# Patient Record
Sex: FEMALE | Race: WHITE | Employment: OTHER | URBAN - NONMETROPOLITAN AREA
[De-identification: names, ages, dates, MRNs, and addresses within clinical notes are randomized per-mention and may not be internally consistent; named-entity substitution may affect disease eponyms.]

---

## 2017-01-30 ENCOUNTER — NURSE TRIAGE (OUTPATIENT)
Dept: ADMINISTRATIVE | Age: 82
End: 2017-01-30

## 2017-03-03 ENCOUNTER — NURSE TRIAGE (OUTPATIENT)
Dept: ADMINISTRATIVE | Age: 82
End: 2017-03-03

## 2017-03-15 ENCOUNTER — OFFICE VISIT (OUTPATIENT)
Dept: CARDIOLOGY | Age: 82
End: 2017-03-15

## 2017-03-15 VITALS
HEIGHT: 64 IN | BODY MASS INDEX: 20.32 KG/M2 | HEART RATE: 64 BPM | SYSTOLIC BLOOD PRESSURE: 168 MMHG | DIASTOLIC BLOOD PRESSURE: 72 MMHG | WEIGHT: 119 LBS

## 2017-03-15 DIAGNOSIS — I44.7 LBBB (LEFT BUNDLE BRANCH BLOCK): Primary | ICD-10-CM

## 2017-03-15 DIAGNOSIS — I10 ESSENTIAL HYPERTENSION: ICD-10-CM

## 2017-03-15 PROCEDURE — 99214 OFFICE O/P EST MOD 30 MIN: CPT | Performed by: NUCLEAR MEDICINE

## 2017-03-15 PROCEDURE — G8484 FLU IMMUNIZE NO ADMIN: HCPCS | Performed by: NUCLEAR MEDICINE

## 2017-03-15 PROCEDURE — G8419 CALC BMI OUT NRM PARAM NOF/U: HCPCS | Performed by: NUCLEAR MEDICINE

## 2017-03-15 PROCEDURE — G8428 CUR MEDS NOT DOCUMENT: HCPCS | Performed by: NUCLEAR MEDICINE

## 2017-03-15 PROCEDURE — 1036F TOBACCO NON-USER: CPT | Performed by: NUCLEAR MEDICINE

## 2017-03-15 PROCEDURE — 1090F PRES/ABSN URINE INCON ASSESS: CPT | Performed by: NUCLEAR MEDICINE

## 2017-03-15 PROCEDURE — 4040F PNEUMOC VAC/ADMIN/RCVD: CPT | Performed by: NUCLEAR MEDICINE

## 2017-03-15 PROCEDURE — 1123F ACP DISCUSS/DSCN MKR DOCD: CPT | Performed by: NUCLEAR MEDICINE

## 2017-03-15 RX ORDER — LISINOPRIL 5 MG/1
5 TABLET ORAL DAILY
Qty: 90 TABLET | Refills: 3 | Status: SHIPPED | OUTPATIENT
Start: 2017-03-15 | End: 2017-06-20 | Stop reason: SDUPTHER

## 2017-03-19 ENCOUNTER — NURSE TRIAGE (OUTPATIENT)
Dept: ADMINISTRATIVE | Age: 82
End: 2017-03-19

## 2017-03-20 ENCOUNTER — NURSE TRIAGE (OUTPATIENT)
Dept: ADMINISTRATIVE | Age: 82
End: 2017-03-20

## 2017-04-05 ENCOUNTER — TELEPHONE (OUTPATIENT)
Dept: CARDIOLOGY | Age: 82
End: 2017-04-05

## 2017-04-05 DIAGNOSIS — R94.39 ABNORMAL STRESS TEST: Primary | ICD-10-CM

## 2017-04-05 DIAGNOSIS — R06.02 SHORTNESS OF BREATH: ICD-10-CM

## 2017-04-28 PROBLEM — Z98.61 S/P PTCA (PERCUTANEOUS TRANSLUMINAL CORONARY ANGIOPLASTY): Status: ACTIVE | Noted: 2017-04-28

## 2017-04-28 PROBLEM — Z98.890 S/P CARDIAC CATHETERIZATION: Status: ACTIVE | Noted: 2017-04-28

## 2017-05-01 ENCOUNTER — TELEPHONE (OUTPATIENT)
Dept: CARDIOLOGY | Age: 82
End: 2017-05-01

## 2017-05-10 RX ORDER — CLOPIDOGREL BISULFATE 75 MG/1
75 TABLET ORAL DAILY
COMMUNITY
End: 2017-05-10 | Stop reason: SDUPTHER

## 2017-05-10 RX ORDER — CLOPIDOGREL BISULFATE 75 MG/1
75 TABLET ORAL DAILY
Qty: 30 TABLET | Refills: 0 | Status: SHIPPED | OUTPATIENT
Start: 2017-05-10 | End: 2017-05-17 | Stop reason: SDUPTHER

## 2017-05-17 RX ORDER — CLOPIDOGREL BISULFATE 75 MG/1
75 TABLET ORAL DAILY
Qty: 90 TABLET | Refills: 3 | Status: SHIPPED | OUTPATIENT
Start: 2017-05-17 | End: 2017-08-14 | Stop reason: SDUPTHER

## 2017-05-22 ENCOUNTER — OFFICE VISIT (OUTPATIENT)
Dept: CARDIOLOGY | Age: 82
End: 2017-05-22

## 2017-05-22 VITALS
SYSTOLIC BLOOD PRESSURE: 116 MMHG | HEIGHT: 64 IN | HEART RATE: 48 BPM | DIASTOLIC BLOOD PRESSURE: 72 MMHG | BODY MASS INDEX: 19.97 KG/M2 | WEIGHT: 117 LBS

## 2017-05-22 DIAGNOSIS — E78.00 PURE HYPERCHOLESTEROLEMIA: ICD-10-CM

## 2017-05-22 DIAGNOSIS — I25.10 CORONARY ARTERY DISEASE INVOLVING NATIVE CORONARY ARTERY OF NATIVE HEART WITHOUT ANGINA PECTORIS: Primary | ICD-10-CM

## 2017-05-22 DIAGNOSIS — Z98.890 S/P CARDIAC CATHETERIZATION: ICD-10-CM

## 2017-05-22 DIAGNOSIS — I10 ESSENTIAL HYPERTENSION: ICD-10-CM

## 2017-05-22 DIAGNOSIS — Z98.61 S/P PTCA (PERCUTANEOUS TRANSLUMINAL CORONARY ANGIOPLASTY): ICD-10-CM

## 2017-05-22 PROCEDURE — 1036F TOBACCO NON-USER: CPT | Performed by: PHYSICIAN ASSISTANT

## 2017-05-22 PROCEDURE — G8419 CALC BMI OUT NRM PARAM NOF/U: HCPCS | Performed by: PHYSICIAN ASSISTANT

## 2017-05-22 PROCEDURE — 4040F PNEUMOC VAC/ADMIN/RCVD: CPT | Performed by: PHYSICIAN ASSISTANT

## 2017-05-22 PROCEDURE — 99213 OFFICE O/P EST LOW 20 MIN: CPT | Performed by: PHYSICIAN ASSISTANT

## 2017-05-22 PROCEDURE — 1090F PRES/ABSN URINE INCON ASSESS: CPT | Performed by: PHYSICIAN ASSISTANT

## 2017-05-22 PROCEDURE — G8427 DOCREV CUR MEDS BY ELIG CLIN: HCPCS | Performed by: PHYSICIAN ASSISTANT

## 2017-05-22 PROCEDURE — 93000 ELECTROCARDIOGRAM COMPLETE: CPT | Performed by: PHYSICIAN ASSISTANT

## 2017-05-22 PROCEDURE — 1123F ACP DISCUSS/DSCN MKR DOCD: CPT | Performed by: PHYSICIAN ASSISTANT

## 2017-05-22 PROCEDURE — G8598 ASA/ANTIPLAT THER USED: HCPCS | Performed by: PHYSICIAN ASSISTANT

## 2017-05-22 RX ORDER — ASPIRIN 81 MG/1
81 TABLET, CHEWABLE ORAL DAILY
COMMUNITY

## 2017-06-20 RX ORDER — LISINOPRIL 5 MG/1
5 TABLET ORAL DAILY
Qty: 90 TABLET | Refills: 3 | Status: ON HOLD | OUTPATIENT
Start: 2017-06-20 | End: 2019-06-18 | Stop reason: SDUPTHER

## 2017-07-30 ENCOUNTER — NURSE TRIAGE (OUTPATIENT)
Dept: ADMINISTRATIVE | Age: 82
End: 2017-07-30

## 2017-08-14 RX ORDER — CLOPIDOGREL BISULFATE 75 MG/1
75 TABLET ORAL DAILY
Qty: 90 TABLET | Refills: 1 | Status: SHIPPED | OUTPATIENT
Start: 2017-08-14 | End: 2018-09-06 | Stop reason: ALTCHOICE

## 2017-11-20 ENCOUNTER — OFFICE VISIT (OUTPATIENT)
Dept: CARDIOLOGY CLINIC | Age: 82
End: 2017-11-20
Payer: MEDICARE

## 2017-11-20 VITALS
WEIGHT: 112 LBS | DIASTOLIC BLOOD PRESSURE: 72 MMHG | BODY MASS INDEX: 19.12 KG/M2 | HEIGHT: 64 IN | HEART RATE: 72 BPM | SYSTOLIC BLOOD PRESSURE: 144 MMHG

## 2017-11-20 DIAGNOSIS — R06.09 DYSPNEA ON EXERTION: ICD-10-CM

## 2017-11-20 DIAGNOSIS — I10 ESSENTIAL HYPERTENSION: Primary | ICD-10-CM

## 2017-11-20 DIAGNOSIS — I25.10 CORONARY ARTERY DISEASE INVOLVING NATIVE CORONARY ARTERY OF NATIVE HEART WITHOUT ANGINA PECTORIS: ICD-10-CM

## 2017-11-20 PROCEDURE — 1123F ACP DISCUSS/DSCN MKR DOCD: CPT | Performed by: NUCLEAR MEDICINE

## 2017-11-20 PROCEDURE — G8598 ASA/ANTIPLAT THER USED: HCPCS | Performed by: NUCLEAR MEDICINE

## 2017-11-20 PROCEDURE — 4040F PNEUMOC VAC/ADMIN/RCVD: CPT | Performed by: NUCLEAR MEDICINE

## 2017-11-20 PROCEDURE — 99213 OFFICE O/P EST LOW 20 MIN: CPT | Performed by: NUCLEAR MEDICINE

## 2017-11-20 PROCEDURE — 1090F PRES/ABSN URINE INCON ASSESS: CPT | Performed by: NUCLEAR MEDICINE

## 2017-11-20 PROCEDURE — 1036F TOBACCO NON-USER: CPT | Performed by: NUCLEAR MEDICINE

## 2017-11-20 PROCEDURE — G8484 FLU IMMUNIZE NO ADMIN: HCPCS | Performed by: NUCLEAR MEDICINE

## 2017-11-20 PROCEDURE — G8427 DOCREV CUR MEDS BY ELIG CLIN: HCPCS | Performed by: NUCLEAR MEDICINE

## 2017-11-20 PROCEDURE — G8420 CALC BMI NORM PARAMETERS: HCPCS | Performed by: NUCLEAR MEDICINE

## 2017-11-20 NOTE — PROGRESS NOTES
[facility] PROFESSIONAL SERVS  [facility] [place] [address]  Dept: [phone]  Dept Fax: [phone]  Loc: [phone]    Visit Date: 11/20/2017    Shahzad Yuen is a 80 y.o. female who presents today for:  Chief Complaint   Patient presents with    Check-Up    Coronary Artery Disease    Hypertension    Hyperlipidemia     Had cath and stent in April   Chronic dyspnea  No chest pain  No changes in breathing  Limited patient  Some denial   BP is stable     HPI:  HPI  Past Medical History:   Diagnosis Date    Asthma     Cancer ([facility] [address]     colon    Hypertension     S/P cardiac catheterization: 4/28/2017: diffuse disease in mid-LAD with multiple lesions in the 80% range. LCX non-dominant with moderate disease. RCA dominant with moderate diffuse disease. 4/28/2017 4/28/2017: diffuse disease in mid-LAD with multiple lesions in the 80% range. LCX non-dominant with moderate disease. RCA dominant with moderate diffuse disease. Radial. 70 cc Dr. Evans García S/P PTCA: 4/28/2017: Stent mid-LAD Xience 3.0 x 33 mm, post-dilated 3.32 mm. Radial 70 cc. 4/28/2017 4/28/2017: Stent mid-LAD Xience 3.0 x 33 mm, post-dilated 3.32 mm. Radial 70 cc.  Dr. Bonne Holter      Past Surgical History:   Procedure Laterality Date   [facility] COLON SURGERY  05/2016    for cancer   [address]    JOINT REPLACEMENT  2007    left knee    SKIN BIOPSY       Family History   Problem Relation Age of Onset    Stroke Mother     High Blood Pressure Mother     Cancer Sister     Asthma Neg Hx     Diabetes Neg Hx     Heart Disease Neg Hx      Social History   Substance Use Topics    Smoking status: Never Smoker    Smokeless tobacco: Never Used    Alcohol use 0.6 oz/week     1 Cans of beer per week      Comment: occ      Current Outpatient Prescriptions   Medication Sig Dispense Refill    clopidogrel (PLAVIX) 75 MG tablet Take 1 tablet by mouth daily 90 gallops  Abdomen:   Soft, non tender, no organomegalies, positive bowel sounds  Extremities:   No edema, no cyanosis, good peripheral pulses  Neurological:   Awake, alert, oriented. No obvious focal deficits  Musculoskelatal:  No obvious deformities    Assessment:     1. Essential hypertension     2. Dyspnea on exertion     3. Coronary artery disease involving native coronary artery of native heart without angina pectoris     cardiac stable for now     Plan:  No Follow-up on file. As above  Continue risk factor modification and medical management  Thank you for allowing me to participate in the care of your patient. Please don't hesitate to contact me regarding any further issues related to the patient care    Orders Placed:  No orders of the defined types were placed in this encounter. Medications Prescribed:  No orders of the defined types were placed in this encounter. Discussed use, benefit, and side effects of prescribed medications. All patient questions answered. Pt voiced understanding. Instructed to continue current medications, diet and exercise. Continue risk factor modification and medical management. Patient agreed with treatment plan. Follow up as directed.     Electronically signed by Alfrieda Hammans, MD on 11/20/2017 at 1:38 PM

## 2017-12-10 ENCOUNTER — NURSE TRIAGE (OUTPATIENT)
Dept: ADMINISTRATIVE | Age: 82
End: 2017-12-10

## 2017-12-10 NOTE — TELEPHONE ENCOUNTER
Reason for Disposition   Minor cut or scratch (all triage questions negative)    Answer Assessment - Initial Assessment Questions  1. APPEARANCE of INJURY: \"What does the injury look like? \"       Small laceration    2. SIZE: \"How large is the cut?\"       An inch   3. BLEEDING: \"Is it bleeding now? \" If so, ask: \"Is it difficult to stop? \"       Scab off on the corner  And started to ooze   4. LOCATION: \"Where is the injury located? \"       Rt keg   5. ONSET: \"How long ago did the injury occur? \"       About 5 days ago   6. MECHANISM: \"Tell me how it happened. \"       Brushed along the doorway   7. TETANUS: \"When was the last tetanus booster? \"      Not known   8. PREGNANCY: \"Is there any chance you are pregnant? \" \"When was your last menstrual period? \"      n/a    Protocols used: CUTS AND LACERATIONS-ADULT-

## 2018-03-10 ENCOUNTER — HOSPITAL ENCOUNTER (EMERGENCY)
Age: 83
Discharge: HOME | End: 2018-03-10
Attending: NURSE PRACTITIONER
Payer: MEDICARE

## 2018-03-10 ENCOUNTER — HOSPITAL ENCOUNTER (EMERGENCY)
Dept: GENERAL RADIOLOGY | Age: 83
Discharge: HOME | End: 2018-03-10
Payer: MEDICARE

## 2018-03-10 VITALS
WEIGHT: 111 LBS | RESPIRATION RATE: 18 BRPM | SYSTOLIC BLOOD PRESSURE: 180 MMHG | HEART RATE: 81 BPM | DIASTOLIC BLOOD PRESSURE: 80 MMHG | BODY MASS INDEX: 19.05 KG/M2 | OXYGEN SATURATION: 96 % | TEMPERATURE: 98.1 F

## 2018-03-10 DIAGNOSIS — S32.591A CLOSED FRACTURE OF MULTIPLE RAMI OF RIGHT PUBIS, INITIAL ENCOUNTER (HCC): Primary | ICD-10-CM

## 2018-03-10 PROCEDURE — 73502 X-RAY EXAM HIP UNI 2-3 VIEWS: CPT

## 2018-03-10 PROCEDURE — 99213 OFFICE O/P EST LOW 20 MIN: CPT

## 2018-03-10 PROCEDURE — 99213 OFFICE O/P EST LOW 20 MIN: CPT | Performed by: NURSE PRACTITIONER

## 2018-03-10 ASSESSMENT — PAIN DESCRIPTION - DESCRIPTORS: DESCRIPTORS: ACHING

## 2018-03-10 ASSESSMENT — PAIN DESCRIPTION - PAIN TYPE: TYPE: ACUTE PAIN

## 2018-03-10 ASSESSMENT — PAIN SCALES - GENERAL: PAINLEVEL_OUTOF10: 8

## 2018-03-10 ASSESSMENT — PAIN DESCRIPTION - ORIENTATION: ORIENTATION: RIGHT

## 2018-03-10 ASSESSMENT — PAIN DESCRIPTION - FREQUENCY: FREQUENCY: INTERMITTENT

## 2018-03-10 ASSESSMENT — PAIN DESCRIPTION - LOCATION: LOCATION: HIP

## 2018-03-10 NOTE — ED PROVIDER NOTES
Colon surgery (05/2016); and Appendectomy (9693). CURRENT MEDICATIONS       Current Discharge Medication List      CONTINUE these medications which have NOT CHANGED    Details   clopidogrel (PLAVIX) 75 MG tablet Take 1 tablet by mouth daily  Qty: 90 tablet, Refills: 1      lisinopril (PRINIVIL;ZESTRIL) 5 MG tablet Take 1 tablet by mouth daily  Qty: 90 tablet, Refills: 3      aspirin 81 MG chewable tablet Take 81 mg by mouth daily      atorvastatin (LIPITOR) 40 MG tablet Take 1 tablet by mouth nightly  Qty: 30 tablet, Refills: 3      Budesonide-Formoterol Fumarate (SYMBICORT IN) Inhale into the lungs as needed      metoprolol tartrate (LOPRESSOR) 25 MG tablet Take 1 tablet by mouth 2 times daily  Qty: 180 tablet, Refills: 3      Omega-3 Fatty Acids (FISH OIL PO) Take by mouth daily       vitamin D3 (CHOLECALCIFEROL) 400 UNITS TABS tablet Take by mouth daily       Calcium Carbonate-Vitamin D (CALCIUM-VITAMIN D3 PO) Take 1,200 mg by mouth daily       Coenzyme Q10 (CO Q 10) 100 MG CAPS Take by mouth daily       nitroGLYCERIN (NITROSTAT) 0.4 MG SL tablet Place 1 tablet under the tongue every 5 minutes as needed for Chest pain up to max of 3 total doses. If no relief after 1 dose, call 911. Qty: 25 tablet, Refills: 1             ALLERGIES     Patient is is allergic to brilinta [ticagrelor] and demerol hcl [meperidine]. FAMILY HISTORY     Patient's family history includes Cancer in her sister; High Blood Pressure in her mother; Stroke in her mother. SOCIAL HISTORY     Patient  reports that she has never smoked. She has never used smokeless tobacco. She reports that she drinks about 0.6 oz of alcohol per week . She reports that she does not use drugs. PHYSICAL EXAM     ED TRIAGE VITALS  BP: (!) 180/80, Temp: 98.1 °F (36.7 °C), Pulse: 81, Resp: 18, SpO2: 96 %  Physical Exam   Constitutional: She is oriented to person, place, and time. She appears well-developed and well-nourished. No distress.    Musculoskeletal: Medication List          Elba Flavors, CNP           Elba Flavors, [address] [facility]  03/10/18 0031

## 2018-03-10 NOTE — ED NOTES
Pt taken to side exit per wheelchair and assisted into her son's car.       Aida Cantor RN  03/10/18 7092

## 2018-03-10 NOTE — ED NOTES
Pt ambulated to room 7 with a slight limp to the right leg. Pt here with right hip pain from a fall that occurred 1 week ago yesterday as stated by the pt. Pt's son claims that the she told him it happened yesterday but the pt insists that fall was a week ago. Pt states that she was cleaning her oven and had a kitchen chair in front of the oven door and was bending over cleaning and twisted falling off the chair. Pt states that she fell onto her right hip on the kitchen floor.  No redness, swelling or bruisng noted to the right hip or thigh where pt is locating the pain at.       Cecy Castillo RN  03/10/18 5161

## 2018-08-28 ENCOUNTER — HOSPITAL ENCOUNTER (OUTPATIENT)
Age: 83
Discharge: HOME | End: 2018-08-28
Payer: MEDICARE

## 2018-08-28 LAB
ANION GAP SERPL CALCULATED.3IONS-SCNC: 11 MEQ/L (ref 8–16)
BUN BLDV-MCNC: 17 MG/DL (ref 7–22)
CALCIUM SERPL-MCNC: 9.1 MG/DL (ref 8.5–10.5)
CHLORIDE BLD-SCNC: 105 MEQ/L (ref 98–111)
CO2: 25 MEQ/L (ref 23–33)
CREAT SERPL-MCNC: 1 MG/DL (ref 0.4–1.2)
EKG ATRIAL RATE: 53 BPM
EKG P AXIS: 81 DEGREES
EKG P-R INTERVAL: 270 MS
EKG Q-T INTERVAL: 528 MS
EKG QRS DURATION: 162 MS
EKG QTC CALCULATION (BAZETT): 495 MS
EKG R AXIS: 82 DEGREES
EKG T AXIS: 103 DEGREES
EKG VENTRICULAR RATE: 53 BPM
ERYTHROCYTE [DISTWIDTH] IN BLOOD BY AUTOMATED COUNT: 13.1 % (ref 11.5–14.5)
ERYTHROCYTE [DISTWIDTH] IN BLOOD BY AUTOMATED COUNT: 46.1 FL (ref 35–45)
GFR SERPL CREATININE-BSD FRML MDRD: 52 ML/MIN/1.73M2
GLUCOSE BLD-MCNC: 83 MG/DL (ref 70–108)
HCT VFR BLD CALC: 34.2 % (ref 37–47)
HEMOGLOBIN: 11.4 GM/DL (ref 12–16)
MCH RBC QN AUTO: 32.3 PG (ref 26–33)
MCHC RBC AUTO-ENTMCNC: 33.3 GM/DL (ref 32.2–35.5)
MCV RBC AUTO: 96.9 FL (ref 81–99)
PLATELET # BLD: 229 THOU/MM3 (ref 130–400)
PMV BLD AUTO: 9.6 FL (ref 9.4–12.4)
POTASSIUM SERPL-SCNC: 4.5 MEQ/L (ref 3.5–5.2)
RBC # BLD: 3.53 MILL/MM3 (ref 4.2–5.4)
SODIUM BLD-SCNC: 141 MEQ/L (ref 135–145)
WBC # BLD: 4.6 THOU/MM3 (ref 4.8–10.8)

## 2018-08-28 PROCEDURE — 93010 ELECTROCARDIOGRAM REPORT: CPT | Performed by: NUCLEAR MEDICINE

## 2018-08-28 PROCEDURE — 85027 COMPLETE CBC AUTOMATED: CPT

## 2018-08-28 PROCEDURE — 93005 ELECTROCARDIOGRAM TRACING: CPT | Performed by: SPECIALIST

## 2018-08-28 PROCEDURE — 80048 BASIC METABOLIC PNL TOTAL CA: CPT

## 2018-08-28 PROCEDURE — 36415 COLL VENOUS BLD VENIPUNCTURE: CPT

## 2018-09-04 NOTE — PROGRESS NOTES
EKG & pt. History reviewed per Dr. Ynes Leonard. OK to proceed with surgery @ [facility]-.

## 2018-09-06 NOTE — PROGRESS NOTES
In preparation for their surgical procedure above patient was screened for Obstructive Sleep Apnea (RANDA) using the STOP-Bang Questionnaire by the Pre-Admission Testing department. This is a pre-surgical screening tool for patient safety and serves as a recommendation, this WILL NOT cause cancellation of surgery. STOP-Bang Questionnaire  * Do you currently see a pulmonologist?  No     If yes STOP, do not complete. P    1. Do you snore loudly (able to be heard in the next room)? No    2. Do you often feel tired or sleepy during the daytime? No       3. Has anyone ever told you that you stop breathing during your sleep? No    4. Do you have or are you being treated for high blood pressure? No      5. BMI more than 35? BMI (Calculated): 18.6        No    6. Age over 48 years? 80 y.o. Yes    7. Neck Circumference greater than 17 inches for male or 16 inches for female? Measured           (visits only)            Not Applicable    8. Gender Male? No      TOTAL SCORE: 1    RANDA - Low Risk : Yes to 0 - 2 questions  RANDA - Intermediate Risk : Yes to 3 - 4 questions  RANDA - High Risk : Yes to 5 - 8 questions    Adapted from:   STOP Questionnaire: A Tool to Screen Patients for Obstructive Sleep Apnea   MAYANK StephensC.P.C., Tamra Klinefelter, M.B.B.S., Lo Pérez M.D., Jassi Daley. Blaze Sen, Ph.D., NEIL Olsen.B.B.S., Mindy Monae, M.Sc., Rosmery Mcgowan M.D., [facility]. ALFRED Chatman.P.C.    Anesthesiology 2008; 913:137-84 Copyright 2008, the [address] of [employer]est[employer], [place].   ----------------------------------------------------------------------------------------------------------------

## 2018-09-06 NOTE — PROGRESS NOTES
NPO after midnight  [employer] and drivers license  Wear comfortable clean clothing  Do not bring jewelry   Shower night before and morning of surgery with a liquid antibacterial soap  Bring medications in original bottles  Follow all instructions given by your physician   needed at discharge  Call [phone] for any questions

## 2018-09-12 ENCOUNTER — HOSPITAL ENCOUNTER (OUTPATIENT)
Age: 83
Setting detail: OUTPATIENT SURGERY
Discharge: HOME | End: 2018-09-12
Attending: SPECIALIST | Admitting: SPECIALIST
Payer: MEDICARE

## 2018-09-12 ENCOUNTER — ANESTHESIA (OUTPATIENT)
Dept: OPERATING ROOM | Age: 83
End: 2018-09-12
Payer: MEDICARE

## 2018-09-12 ENCOUNTER — ANESTHESIA EVENT (OUTPATIENT)
Dept: OPERATING ROOM | Age: 83
End: 2018-09-12
Payer: MEDICARE

## 2018-09-12 VITALS
BODY MASS INDEX: 16.81 KG/M2 | SYSTOLIC BLOOD PRESSURE: 113 MMHG | TEMPERATURE: 97.3 F | WEIGHT: 104.6 LBS | RESPIRATION RATE: 16 BRPM | OXYGEN SATURATION: 98 % | DIASTOLIC BLOOD PRESSURE: 56 MMHG | HEART RATE: 68 BPM | HEIGHT: 66 IN

## 2018-09-12 VITALS
DIASTOLIC BLOOD PRESSURE: 67 MMHG | SYSTOLIC BLOOD PRESSURE: 157 MMHG | RESPIRATION RATE: 24 BRPM | OXYGEN SATURATION: 93 %

## 2018-09-12 PROCEDURE — 3600000002 HC SURGERY LEVEL 2 BASE: Performed by: SPECIALIST

## 2018-09-12 PROCEDURE — 3700000001 HC ADD 15 MINUTES (ANESTHESIA): Performed by: SPECIALIST

## 2018-09-12 PROCEDURE — 6360000002 HC RX W HCPCS: Performed by: SPECIALIST

## 2018-09-12 PROCEDURE — 7100000011 HC PHASE II RECOVERY - ADDTL 15 MIN: Performed by: SPECIALIST

## 2018-09-12 PROCEDURE — 2709999900 HC NON-CHARGEABLE SUPPLY: Performed by: SPECIALIST

## 2018-09-12 PROCEDURE — 2580000003 HC RX 258: Performed by: SPECIALIST

## 2018-09-12 PROCEDURE — 6360000002 HC RX W HCPCS: Performed by: NURSE ANESTHETIST, CERTIFIED REGISTERED

## 2018-09-12 PROCEDURE — 2500000003 HC RX 250 WO HCPCS: Performed by: SPECIALIST

## 2018-09-12 PROCEDURE — 3700000000 HC ANESTHESIA ATTENDED CARE: Performed by: SPECIALIST

## 2018-09-12 PROCEDURE — 3600000012 HC SURGERY LEVEL 2 ADDTL 15MIN: Performed by: SPECIALIST

## 2018-09-12 PROCEDURE — 7100000010 HC PHASE II RECOVERY - FIRST 15 MIN: Performed by: SPECIALIST

## 2018-09-12 RX ORDER — PROPOFOL 10 MG/ML
INJECTION, EMULSION INTRAVENOUS CONTINUOUS PRN
Status: DISCONTINUED | OUTPATIENT
Start: 2018-09-12 | End: 2018-09-12 | Stop reason: SDUPTHER

## 2018-09-12 RX ORDER — MIDAZOLAM HYDROCHLORIDE 1 MG/ML
INJECTION INTRAMUSCULAR; INTRAVENOUS PRN
Status: DISCONTINUED | OUTPATIENT
Start: 2018-09-12 | End: 2018-09-12 | Stop reason: SDUPTHER

## 2018-09-12 RX ORDER — LIDOCAINE HYDROCHLORIDE AND EPINEPHRINE 10; 10 MG/ML; UG/ML
INJECTION, SOLUTION INFILTRATION; PERINEURAL PRN
Status: DISCONTINUED | OUTPATIENT
Start: 2018-09-12 | End: 2018-09-12 | Stop reason: HOSPADM

## 2018-09-12 RX ORDER — SODIUM CHLORIDE 9 MG/ML
INJECTION, SOLUTION INTRAVENOUS CONTINUOUS
Status: DISCONTINUED | OUTPATIENT
Start: 2018-09-12 | End: 2018-09-12 | Stop reason: HOSPADM

## 2018-09-12 RX ORDER — SODIUM CHLORIDE 9 MG/ML
INJECTION, SOLUTION INTRAVENOUS CONTINUOUS
Status: DISCONTINUED | OUTPATIENT
Start: 2018-09-12 | End: 2018-09-12 | Stop reason: DRUGHIGH

## 2018-09-12 RX ORDER — CEFADROXIL 500 MG/1
500 CAPSULE ORAL 2 TIMES DAILY
Status: ON HOLD | COMMUNITY
End: 2019-06-17 | Stop reason: HOSPADM

## 2018-09-12 RX ADMIN — PROPOFOL 25 MCG/KG/MIN: 10 INJECTION, EMULSION INTRAVENOUS at 13:13

## 2018-09-12 RX ADMIN — MIDAZOLAM HYDROCHLORIDE 2 MG: 1 INJECTION, SOLUTION INTRAMUSCULAR; INTRAVENOUS at 13:12

## 2018-09-12 RX ADMIN — SODIUM CHLORIDE: 9 INJECTION, SOLUTION INTRAVENOUS at 13:10

## 2018-09-12 RX ADMIN — CEFAZOLIN 1 G: 1 INJECTION, POWDER, FOR SOLUTION INTRAMUSCULAR; INTRAVENOUS; PARENTERAL at 13:23

## 2018-09-12 ASSESSMENT — PULMONARY FUNCTION TESTS
PIF_VALUE: 0
PIF_VALUE: 0
PIF_VALUE: 1
PIF_VALUE: 0
PIF_VALUE: 1
PIF_VALUE: 0
PIF_VALUE: 1
PIF_VALUE: 0
PIF_VALUE: 1
PIF_VALUE: 0
PIF_VALUE: 1
PIF_VALUE: 0
PIF_VALUE: 1
PIF_VALUE: 0

## 2018-09-12 ASSESSMENT — PAIN SCALES - GENERAL: PAINLEVEL_OUTOF10: 0

## 2018-09-12 ASSESSMENT — PAIN - FUNCTIONAL ASSESSMENT: PAIN_FUNCTIONAL_ASSESSMENT: 0-10

## 2018-09-12 NOTE — PROGRESS NOTES
Dr. Franklyn Cuadra updated that patient's daughter would like to speak to him post op about wound care.

## 2018-09-12 NOTE — ANESTHESIA PRE PROCEDURE
Department of Anesthesiology  Preprocedure Note       Name:  Matilda Rosado   Age:  80 y.o.  :  8/3/1930                                          MRN:  [id number]         Date:  2018      Surgeon: Vincent Faye):  Jordon Cespedes MD    Procedure: Procedure(s):  MOHS REPAIR BCC RIGHT NASAL TIP    Medications prior to admission:   Prior to Admission medications    Medication Sig Start Date End Date Taking? Authorizing Provider   cefadroxil (DURICEF) 500 MG capsule Take 500 mg by mouth 2 times daily   Yes Historical Provider, MD   lisinopril (PRINIVIL;ZESTRIL) 5 MG tablet Take 1 tablet by mouth daily 17  Yes Cody Bañuelos MD   aspirin 81 MG chewable tablet Take 81 mg by mouth daily   Yes Historical Provider, MD   atorvastatin (LIPITOR) 40 MG tablet Take 1 tablet by mouth nightly 17  Yes JASE Flaherty CNP   metoprolol tartrate (LOPRESSOR) 25 MG tablet Take 1 tablet by mouth 2 times daily 3/15/17  Yes Cody Bañuelos MD   Omega-3 Fatty Acids (FISH OIL PO) Take by mouth daily    Yes Historical Provider, MD   vitamin D3 (CHOLECALCIFEROL) 400 UNITS TABS tablet Take by mouth daily    Yes Historical Provider, MD   Calcium Carbonate-Vitamin D (CALCIUM-VITAMIN D3 PO) Take 1,200 mg by mouth daily    Yes Historical Provider, MD   Coenzyme Q10 (CO Q 10) 100 MG CAPS Take by mouth daily    Yes Historical Provider, MD   nitroGLYCERIN (NITROSTAT) 0.4 MG SL tablet Place 1 tablet under the tongue every 5 minutes as needed for Chest pain up to max of 3 total doses.  If no relief after 1 dose, call 911. 17   JASE Flaherty CNP   Budesonide-Formoterol Fumarate (SYMBICORT IN) Inhale into the lungs as needed    Historical Provider, MD       Current medications:    Current Facility-Administered Medications   Medication Dose Route Frequency Provider Last Rate Last Dose    0.9 % sodium chloride infusion   Intravenous Continuous Jordon Cespedes MD        ceFAZolin (ANCEF) 1 g in Counseling given: Not Answered      Vital Signs (Current):   Vitals:    09/06/18 1332   Weight: 115 lb (52.2 kg)   Height: 5' 6\" (1.676 m)                                              BP Readings from Last 3 Encounters:   03/10/18 (!) 180/80   11/20/17 (!) 144/72   05/22/17 116/72       NPO Status: Time of last liquid consumption: 0645 (sip of water with med this am)                        Time of last solid consumption: 1700                        Date of last liquid consumption: 09/12/18                        Date of last solid food consumption: 09/11/18    BMI:   Wt Readings from Last 3 Encounters:   09/06/18 115 lb (52.2 kg)   03/10/18 111 lb (50.3 kg)   11/20/17 112 lb (50.8 kg)     Body mass index is 18.56 kg/m². CBC:   Lab Results   Component Value Date    WBC 4.6 08/28/2018    RBC 3.53 08/28/2018    HGB 11.4 08/28/2018    HCT 34.2 08/28/2018    MCV 96.9 08/28/2018    RDW 13.7 04/29/2017     08/28/2018       CMP:   Lab Results   Component Value Date     08/28/2018    K 4.5 08/28/2018     08/28/2018    CO2 25 08/28/2018    BUN 17 08/28/2018    CREATININE 1.0 08/28/2018    LABGLOM 52 08/28/2018    GLUCOSE 83 08/28/2018    PROT 6.6 04/28/2017    CALCIUM 9.1 08/28/2018    BILITOT 0.5 04/28/2017    ALKPHOS 52 04/28/2017    AST 20 04/28/2017    ALT 8 04/28/2017       POC Tests: No results for input(s): POCGLU, POCNA, POCK, POCCL, POCBUN, POCHEMO, POCHCT in the last 72 hours.     Coags:   Lab Results   Component Value Date    INR 0.99 04/29/2017       HCG (If Applicable): No results found for: PREGTESTUR, PREGSERUM, HCG, HCGQUANT     ABGs: No results found for: PHART, PO2ART, TII7WTV, RGD7VGN, BEART, K0VPDROC     Type & Screen (If Applicable):  Lab Results   Component Value Date    [address] 04/28/2017       Anesthesia Evaluation  Patient summary reviewed and Nursing notes reviewed no history of anesthetic complications:   Airway: Mallampati: II  TM distance: >3 FB   Neck ROM: limited  Mouth opening: > = 3 FB Dental:          Pulmonary:normal exam  breath sounds clear to auscultation  (+) asthma:                            Cardiovascular:  Exercise tolerance: poor (<4 METS),   (+) hypertension:, CAD: obstructive, CABG/stent:,       ECG reviewed                        Neuro/Psych:   Negative Neuro/Psych ROS              GI/Hepatic/Renal: Neg GI/Hepatic/Renal ROS            Endo/Other: Negative Endo/Other ROS             Pt had no PAT visit       Abdominal:           Vascular: negative vascular ROS. Anesthesia Plan      MAC     ASA 3       Induction: intravenous. Anesthetic plan and risks discussed with patient. Plan discussed with CRNA.                   Prasanth Cullen,    9/12/2018

## 2019-06-13 ENCOUNTER — HOSPITAL ENCOUNTER (INPATIENT)
Age: 84
LOS: 4 days | Discharge: NURSING, REHAB OR LONG-TERM CARE | DRG: 057 | End: 2019-06-18
Attending: INTERNAL MEDICINE | Admitting: INTERNAL MEDICINE
Payer: MEDICARE

## 2019-06-13 ENCOUNTER — APPOINTMENT (OUTPATIENT)
Dept: CT IMAGING | Age: 84
DRG: 057 | End: 2019-06-13
Payer: MEDICARE

## 2019-06-13 ENCOUNTER — APPOINTMENT (OUTPATIENT)
Dept: GENERAL RADIOLOGY | Age: 84
DRG: 057 | End: 2019-06-13
Payer: MEDICARE

## 2019-06-13 DIAGNOSIS — R41.0 DISORIENTATION: Primary | ICD-10-CM

## 2019-06-13 DIAGNOSIS — F03.90 DEMENTIA WITHOUT BEHAVIORAL DISTURBANCE, UNSPECIFIED DEMENTIA TYPE: ICD-10-CM

## 2019-06-13 LAB
ALBUMIN SERPL-MCNC: 4.2 G/DL (ref 3.5–5.1)
ALP BLD-CCNC: 65 U/L (ref 38–126)
ALT SERPL-CCNC: 11 U/L (ref 11–66)
ANION GAP SERPL CALCULATED.3IONS-SCNC: 12 MEQ/L (ref 8–16)
AST SERPL-CCNC: 23 U/L (ref 5–40)
BACTERIA: ABNORMAL /HPF
BASOPHILS # BLD: 1 %
BASOPHILS ABSOLUTE: 0 THOU/MM3 (ref 0–0.1)
BILIRUB SERPL-MCNC: 0.2 MG/DL (ref 0.3–1.2)
BILIRUBIN DIRECT: < 0.2 MG/DL (ref 0–0.3)
BILIRUBIN URINE: NEGATIVE
BLOOD, URINE: NEGATIVE
BUN BLDV-MCNC: 26 MG/DL (ref 7–22)
CALCIUM SERPL-MCNC: 9.5 MG/DL (ref 8.5–10.5)
CASTS 2: ABNORMAL /LPF
CASTS UA: ABNORMAL /LPF
CHARACTER, URINE: CLEAR
CHLORIDE BLD-SCNC: 104 MEQ/L (ref 98–111)
CO2: 25 MEQ/L (ref 23–33)
COLOR: YELLOW
CREAT SERPL-MCNC: 1 MG/DL (ref 0.4–1.2)
CRYSTALS, UA: ABNORMAL
EKG ATRIAL RATE: 70 BPM
EKG P AXIS: 80 DEGREES
EKG P-R INTERVAL: 244 MS
EKG Q-T INTERVAL: 466 MS
EKG QRS DURATION: 158 MS
EKG QTC CALCULATION (BAZETT): 503 MS
EKG R AXIS: 55 DEGREES
EKG T AXIS: 138 DEGREES
EKG VENTRICULAR RATE: 70 BPM
EOSINOPHIL # BLD: 1.9 %
EOSINOPHILS ABSOLUTE: 0.1 THOU/MM3 (ref 0–0.4)
EPITHELIAL CELLS, UA: ABNORMAL /HPF
ERYTHROCYTE [DISTWIDTH] IN BLOOD BY AUTOMATED COUNT: 12.8 % (ref 11.5–14.5)
ERYTHROCYTE [DISTWIDTH] IN BLOOD BY AUTOMATED COUNT: 45.8 FL (ref 35–45)
GFR SERPL CREATININE-BSD FRML MDRD: 52 ML/MIN/1.73M2
GLUCOSE BLD-MCNC: 104 MG/DL (ref 70–108)
GLUCOSE BLD-MCNC: 119 MG/DL (ref 70–108)
GLUCOSE URINE: NEGATIVE MG/DL
HCT VFR BLD CALC: 34.9 % (ref 37–47)
HEMOGLOBIN: 11.5 GM/DL (ref 12–16)
IMMATURE GRANS (ABS): 0.01 THOU/MM3 (ref 0–0.07)
IMMATURE GRANULOCYTES: 0.2 %
KETONES, URINE: NEGATIVE
LEUKOCYTE ESTERASE, URINE: ABNORMAL
LIPASE: 43.9 U/L (ref 5.6–51.3)
LYMPHOCYTES # BLD: 24.6 %
LYMPHOCYTES ABSOLUTE: 1 THOU/MM3 (ref 1–4.8)
MCH RBC QN AUTO: 32.3 PG (ref 26–33)
MCHC RBC AUTO-ENTMCNC: 33 GM/DL (ref 32.2–35.5)
MCV RBC AUTO: 98 FL (ref 81–99)
MISCELLANEOUS 2: ABNORMAL
MONOCYTES # BLD: 11.4 %
MONOCYTES ABSOLUTE: 0.5 THOU/MM3 (ref 0.4–1.3)
NITRITE, URINE: NEGATIVE
NUCLEATED RED BLOOD CELLS: 0 /100 WBC
OSMOLALITY CALCULATION: 286.3 MOSMOL/KG (ref 275–300)
PH UA: 6 (ref 5–9)
PLATELET # BLD: 167 THOU/MM3 (ref 130–400)
PMV BLD AUTO: 10.1 FL (ref 9.4–12.4)
POTASSIUM SERPL-SCNC: 4.6 MEQ/L (ref 3.5–5.2)
PROTEIN UA: NEGATIVE
RBC # BLD: 3.56 MILL/MM3 (ref 4.2–5.4)
RBC URINE: ABNORMAL /HPF
RENAL EPITHELIAL, UA: ABNORMAL
SEG NEUTROPHILS: 60.9 %
SEGMENTED NEUTROPHILS ABSOLUTE COUNT: 2.5 THOU/MM3 (ref 1.8–7.7)
SODIUM BLD-SCNC: 141 MEQ/L (ref 135–145)
SPECIFIC GRAVITY, URINE: 1.01 (ref 1–1.03)
TOTAL PROTEIN: 6.8 G/DL (ref 6.1–8)
TROPONIN T: < 0.01 NG/ML
TSH SERPL DL<=0.05 MIU/L-ACNC: 6.38 UIU/ML (ref 0.4–4.2)
UROBILINOGEN, URINE: 0.2 EU/DL (ref 0–1)
WBC # BLD: 4.1 THOU/MM3 (ref 4.8–10.8)
WBC UA: ABNORMAL /HPF
YEAST: ABNORMAL

## 2019-06-13 PROCEDURE — 82948 REAGENT STRIP/BLOOD GLUCOSE: CPT

## 2019-06-13 PROCEDURE — 81001 URINALYSIS AUTO W/SCOPE: CPT

## 2019-06-13 PROCEDURE — 80076 HEPATIC FUNCTION PANEL: CPT

## 2019-06-13 PROCEDURE — 80048 BASIC METABOLIC PNL TOTAL CA: CPT

## 2019-06-13 PROCEDURE — 84439 ASSAY OF FREE THYROXINE: CPT

## 2019-06-13 PROCEDURE — 70450 CT HEAD/BRAIN W/O DYE: CPT

## 2019-06-13 PROCEDURE — 85025 COMPLETE CBC W/AUTO DIFF WBC: CPT

## 2019-06-13 PROCEDURE — G0378 HOSPITAL OBSERVATION PER HR: HCPCS

## 2019-06-13 PROCEDURE — 84484 ASSAY OF TROPONIN QUANT: CPT

## 2019-06-13 PROCEDURE — 83690 ASSAY OF LIPASE: CPT

## 2019-06-13 PROCEDURE — 36415 COLL VENOUS BLD VENIPUNCTURE: CPT

## 2019-06-13 PROCEDURE — 93005 ELECTROCARDIOGRAM TRACING: CPT | Performed by: NURSE PRACTITIONER

## 2019-06-13 PROCEDURE — 84443 ASSAY THYROID STIM HORMONE: CPT

## 2019-06-13 PROCEDURE — 71045 X-RAY EXAM CHEST 1 VIEW: CPT

## 2019-06-13 PROCEDURE — 2709999900 HC NON-CHARGEABLE SUPPLY

## 2019-06-13 PROCEDURE — 99285 EMERGENCY DEPT VISIT HI MDM: CPT

## 2019-06-13 RX ORDER — ACETAMINOPHEN 325 MG/1
650 TABLET ORAL EVERY 4 HOURS PRN
Status: DISCONTINUED | OUTPATIENT
Start: 2019-06-13 | End: 2019-06-14 | Stop reason: HOSPADM

## 2019-06-13 RX ORDER — SODIUM CHLORIDE 0.9 % (FLUSH) 0.9 %
10 SYRINGE (ML) INJECTION EVERY 12 HOURS SCHEDULED
Status: DISCONTINUED | OUTPATIENT
Start: 2019-06-14 | End: 2019-06-18 | Stop reason: HOSPADM

## 2019-06-13 RX ORDER — SODIUM CHLORIDE 0.9 % (FLUSH) 0.9 %
10 SYRINGE (ML) INJECTION PRN
Status: DISCONTINUED | OUTPATIENT
Start: 2019-06-13 | End: 2019-06-18 | Stop reason: HOSPADM

## 2019-06-14 PROBLEM — R41.0 DELIRIUM: Status: ACTIVE | Noted: 2019-06-14

## 2019-06-14 LAB — T4 FREE: 1.21 NG/DL (ref 0.93–1.76)

## 2019-06-14 PROCEDURE — 2709999900 HC NON-CHARGEABLE SUPPLY

## 2019-06-14 PROCEDURE — 96376 TX/PRO/DX INJ SAME DRUG ADON: CPT

## 2019-06-14 PROCEDURE — 93010 ELECTROCARDIOGRAM REPORT: CPT | Performed by: NUCLEAR MEDICINE

## 2019-06-14 PROCEDURE — 96372 THER/PROPH/DIAG INJ SC/IM: CPT

## 2019-06-14 PROCEDURE — 94640 AIRWAY INHALATION TREATMENT: CPT

## 2019-06-14 PROCEDURE — 1200000000 HC SEMI PRIVATE

## 2019-06-14 PROCEDURE — 96374 THER/PROPH/DIAG INJ IV PUSH: CPT

## 2019-06-14 PROCEDURE — 2580000003 HC RX 258: Performed by: INTERNAL MEDICINE

## 2019-06-14 PROCEDURE — 6370000000 HC RX 637 (ALT 250 FOR IP): Performed by: INTERNAL MEDICINE

## 2019-06-14 PROCEDURE — 6360000002 HC RX W HCPCS: Performed by: INTERNAL MEDICINE

## 2019-06-14 RX ORDER — MULTIVIT,IRON,MINERALS/LUTEIN
1 TABLET ORAL DAILY
Status: DISCONTINUED | OUTPATIENT
Start: 2019-06-14 | End: 2019-06-14 | Stop reason: CLARIF

## 2019-06-14 RX ORDER — ACETAMINOPHEN 325 MG/1
650 TABLET ORAL EVERY 4 HOURS PRN
Status: DISCONTINUED | OUTPATIENT
Start: 2019-06-14 | End: 2019-06-18 | Stop reason: HOSPADM

## 2019-06-14 RX ORDER — DONEPEZIL HYDROCHLORIDE 5 MG/1
5 TABLET, FILM COATED ORAL NIGHTLY
Status: DISCONTINUED | OUTPATIENT
Start: 2019-06-14 | End: 2019-06-18 | Stop reason: HOSPADM

## 2019-06-14 RX ORDER — ZIPRASIDONE MESYLATE 20 MG/ML
10 INJECTION, POWDER, LYOPHILIZED, FOR SOLUTION INTRAMUSCULAR EVERY 8 HOURS PRN
Status: DISCONTINUED | OUTPATIENT
Start: 2019-06-14 | End: 2019-06-15

## 2019-06-14 RX ORDER — LISINOPRIL 5 MG/1
5 TABLET ORAL DAILY
Status: DISCONTINUED | OUTPATIENT
Start: 2019-06-14 | End: 2019-06-18 | Stop reason: HOSPADM

## 2019-06-14 RX ORDER — ATORVASTATIN CALCIUM 40 MG/1
40 TABLET, FILM COATED ORAL NIGHTLY
Status: DISCONTINUED | OUTPATIENT
Start: 2019-06-14 | End: 2019-06-18 | Stop reason: HOSPADM

## 2019-06-14 RX ORDER — OMEGA-3-ACID ETHYL ESTERS 1 G/1
1000 CAPSULE, LIQUID FILLED ORAL DAILY
Status: DISCONTINUED | OUTPATIENT
Start: 2019-06-15 | End: 2019-06-18 | Stop reason: HOSPADM

## 2019-06-14 RX ORDER — BUDESONIDE AND FORMOTEROL FUMARATE DIHYDRATE 160; 4.5 UG/1; UG/1
2 AEROSOL RESPIRATORY (INHALATION) 2 TIMES DAILY
Status: DISCONTINUED | OUTPATIENT
Start: 2019-06-14 | End: 2019-06-14 | Stop reason: CLARIF

## 2019-06-14 RX ORDER — OYSTER SHELL CALCIUM WITH VITAMIN D 500; 200 MG/1; [IU]/1
1 TABLET, FILM COATED ORAL DAILY
Status: DISCONTINUED | OUTPATIENT
Start: 2019-06-15 | End: 2019-06-18 | Stop reason: HOSPADM

## 2019-06-14 RX ORDER — ASPIRIN 81 MG/1
81 TABLET, CHEWABLE ORAL DAILY
Status: DISCONTINUED | OUTPATIENT
Start: 2019-06-14 | End: 2019-06-18 | Stop reason: HOSPADM

## 2019-06-14 RX ORDER — HYDRALAZINE HYDROCHLORIDE 20 MG/ML
10 INJECTION INTRAMUSCULAR; INTRAVENOUS EVERY 4 HOURS PRN
Status: DISCONTINUED | OUTPATIENT
Start: 2019-06-14 | End: 2019-06-18 | Stop reason: HOSPADM

## 2019-06-14 RX ORDER — NITROGLYCERIN 0.4 MG/1
0.4 TABLET SUBLINGUAL EVERY 5 MIN PRN
Status: DISCONTINUED | OUTPATIENT
Start: 2019-06-14 | End: 2019-06-18 | Stop reason: HOSPADM

## 2019-06-14 RX ADMIN — ENOXAPARIN SODIUM 30 MG: 30 INJECTION SUBCUTANEOUS at 17:00

## 2019-06-14 RX ADMIN — DONEPEZIL HYDROCHLORIDE 5 MG: 5 TABLET, FILM COATED ORAL at 20:22

## 2019-06-14 RX ADMIN — ATORVASTATIN CALCIUM 40 MG: 40 TABLET, FILM COATED ORAL at 20:22

## 2019-06-14 RX ADMIN — ASPIRIN 81 MG 81 MG: 81 TABLET ORAL at 17:00

## 2019-06-14 RX ADMIN — LISINOPRIL 5 MG: 5 TABLET ORAL at 20:22

## 2019-06-14 RX ADMIN — Medication 10 ML: at 20:22

## 2019-06-14 RX ADMIN — Medication 2 PUFF: at 20:31

## 2019-06-14 RX ADMIN — HYDRALAZINE HYDROCHLORIDE 10 MG: 20 INJECTION INTRAMUSCULAR; INTRAVENOUS at 02:56

## 2019-06-14 RX ADMIN — HYDRALAZINE HYDROCHLORIDE 10 MG: 20 INJECTION INTRAMUSCULAR; INTRAVENOUS at 08:46

## 2019-06-14 RX ADMIN — Medication 10 ML: at 08:49

## 2019-06-14 RX ADMIN — HYDRALAZINE HYDROCHLORIDE 10 MG: 20 INJECTION INTRAMUSCULAR; INTRAVENOUS at 17:01

## 2019-06-14 RX ADMIN — METOPROLOL TARTRATE 25 MG: 25 TABLET ORAL at 20:22

## 2019-06-14 ASSESSMENT — PAIN SCALES - GENERAL: PAINLEVEL_OUTOF10: 0

## 2019-06-14 NOTE — ED NOTES
Patient is resting in bed with family at bedside. Patient drinking glass of ice water. Patient's respirations are easy and unlabored. Patient denies any needs or complaints at this time. Will continue to monitor.       Avis Roman RN  06/13/19 5738

## 2019-06-14 NOTE — FLOWSHEET NOTE
Pt was confused . She was anointed. 06/14/19 1923   Encounter Summary   Services provided to: Patient and family together   Referral/Consult From: [address] of [address] [place] [address] Visiting Yes  (6/14 )   Complexity of Encounter Low   Length of Encounter 15 minutes   Routine   Type Initial   Assessment Approachable;Calm   Intervention [place];Prayer;Nurtured hope   Outcome Acceptance;Expressed gratitude;Encouraged; Hopeful;Receptive   Sacraments   Sacrament of Sick-Anointing Anointed   Advance Directives (For Healthcare)   Healthcare Directive Yes, patient has an advance directive for healthcare treatment

## 2019-06-14 NOTE — CARE COORDINATION
Discharge Barriers 6/14/19 11:49am  RIN contacted  Ester Dave from [facility]  inquiring about bed availability and acceptance for possible long term placement. RIN gave all requested information. Babar Moss states she does not see an issue with accepting this patient. Babar Moss states they will contact patients daughters and get additional information from them.

## 2019-06-14 NOTE — PLAN OF CARE
Patients family is requesting ECF placement as patient is no longer safe in her home environment due to dementia and memory deficits. Family requesting [facility] as 1st choice. [phone] as second. Family all live in the [place] area.

## 2019-06-14 NOTE — PLAN OF CARE
Problem: Falls - Risk of:  Goal: Will remain free from falls  Description  Will remain free from falls  Outcome: Met This Shift  Fall assessment completed. Patient using call light appropriately to call for assistance with ambulation to bathroom. Personal items within reach. Patient is also compliant with use of non-skid slippers. Problem: Injury - Risk of, Physical Injury:  Goal: Will remain free from falls  Description  Will remain free from falls  Outcome: Met This Shift  No injuries or falls for shift. Problem: Confusion - Acute:  Goal: Absence of continued neurological deterioration signs and symptoms  Description  Absence of continued neurological deterioration signs and symptoms  Outcome: Ongoing  Patient alert and oriented to name only. Ongoing confusion noted. Problem: Discharge Planning:  Goal: Ability to perform activities of daily living will improve  Description  Ability to perform activities of daily living will improve  Outcome: Ongoing   Discharge plan is in process. Plan discharge to [facility]. Problem: Mood - Altered:  Goal: Mood stable  Description  Mood stable  Outcome: Ongoing  Mood stable. Problem: Sleep Pattern Disturbance:  Goal: Appears well-rested  Description  Appears well-rested  Outcome: Ongoing  Sleeping patterns WNL. Problem: Discharge Planning:  Goal: Patients continuum of care needs are met  Description  Patients continuum of care needs are met  6/14/2019 1947 by Jose Luis Orozco RN  Outcome: Ongoing  Plan discharge to [facility].  6/14/2019 1114 by LACY Bhakta  Outcome: Ongoing    Care plan reviewed with patient and family. Patient and family verbalize understanding of the plan of care and contribute to goal setting.

## 2019-06-14 NOTE — H&P
Internal Medicine  History and Physical    Patient:  Monserrat Miner  MRN: [id number]      History Obtained From:  patient, family member - daughters  PCP: Darrell Elliott MD    CHIEF COMPLAINT:  confusion    HISTORY OF PRESENT ILLNESS:   The patient is a 80 y.o. female who presents with increasing confusion in the last 4 months. Symptom got worse in the last week. She hallucinates, having called 302 / police to her house in the last few weeks 3 times for non existent threats. She lives alone, eats poorly. She is weak. She denies CP, no SOB. Past Medical History:        Diagnosis Date    Asthma     CAD (coronary artery disease)     Cancer ([facility] [address]     colon    Hypertension     S/P cardiac catheterization: 4/28/2017: diffuse disease in mid-LAD with multiple lesions in the 80% range. LCX non-dominant with moderate disease. RCA dominant with moderate diffuse disease. 4/28/2017 4/28/2017: diffuse disease in mid-LAD with multiple lesions in the 80% range. LCX non-dominant with moderate disease. RCA dominant with moderate diffuse disease. Radial. 70 cc Dr. Ann Marie Dawson S/P PTCA: 4/28/2017: Stent mid-LAD Xience 3.0 x 33 mm, post-dilated 3.32 mm. Radial 70 cc. 4/28/2017 4/28/2017: Stent mid-LAD Xience 3.0 x 33 mm, post-dilated 3.32 mm. Radial 70 cc. Dr. Honorio Ponce       Past Surgical History:        Procedure Laterality Date   [address]  04/2017    stent    COLON SURGERY  05/2016    for cancer   [address]    JOINT REPLACEMENT  2007    left knee    [place] OFFICE/OUTPT VISIT,PROCEDURE ONLY Right 9/12/2018    MOHS REPAIR BCC RIGHT NASAL TIP performed by Fiona Kaur MD at [facility]         Medications Prior to Admission:    Prior to Admission medications    Medication Sig Start Date End Date Taking?  Authorizing Provider   cefadroxil (DURICEF) 500 MG capsule Take 500 mg by mouth 2 times daily   Yes Historical Provider, MD   lisinopril (PRINIVIL;ZESTRIL) 5 MG tablet Take 1 tablet by mouth daily 6/20/17  Yes Pan Ordaz MD   aspirin 81 MG chewable tablet Take 81 mg by mouth daily   Yes Historical Provider, MD   atorvastatin (LIPITOR) 40 MG tablet Take 1 tablet by mouth nightly 4/29/17  Yes JASE Key CNP   nitroGLYCERIN (NITROSTAT) 0.4 MG SL tablet Place 1 tablet under the tongue every 5 minutes as needed for Chest pain up to max of 3 total doses. If no relief after 1 dose, call 911. 4/29/17  Yes JASE Key CNP   Budesonide-Formoterol Fumarate (SYMBICORT IN) Inhale into the lungs as needed   Yes Historical Provider, MD   metoprolol tartrate (LOPRESSOR) 25 MG tablet Take 1 tablet by mouth 2 times daily 3/15/17  Yes Pan Ordaz MD   Omega-3 Fatty Acids (FISH OIL PO) Take by mouth daily    Yes Historical Provider, MD   vitamin D3 (CHOLECALCIFEROL) 400 UNITS TABS tablet Take by mouth daily    Yes Historical Provider, MD   Calcium Carbonate-Vitamin D (CALCIUM-VITAMIN D3 PO) Take 1,200 mg by mouth daily    Yes Historical Provider, MD   Coenzyme Q10 (CO Q 10) 100 MG CAPS Take by mouth daily    Yes Historical Provider, MD       Allergies:  Brilinta [ticagrelor] and Demerol hcl [meperidine]    Social History:   TOBACCO:   reports that she has never smoked. She has never used smokeless tobacco.  ETOH:   reports that she drinks about 0.6 oz of alcohol per week.     Family History:       Problem Relation Age of Onset    Stroke Mother     High Blood Pressure Mother     Cancer Sister     Asthma Neg Hx     Diabetes Neg Hx     Heart Disease Neg Hx        REVIEW OF SYSTEMS:  CONSTITUTIONAL:  positive for  fatigue, malaise and anorexia  negative for  fevers  EYES:  negative for  redness and icterus  HEENT:  negative for  sore mouth, sore throat and hoarseness  RESPIRATORY:  negative for  dry cough, dyspnea and wheezing  CARDIOVASCULAR:  negative for  chest pain, dyspnea, syncope  GASTROINTESTINAL:  positive for anorexia  negative for abdominal pain, abdominal mass, abdominal distention and hematemesis  GENITOURINARY:  negative for frequency and dysuria  ENDOCRINE:  negative for heat intolerance and cold intolerance  MUSCULOSKELETAL:  negative  NEUROLOGICAL:  positive for memory problems and weakness  negative for headaches  BEHAVIOR/PSYCH:  positive for poor appetite, decreased energy level, fatigue, increased agitation and confusion and negative for increased agitation    Physical Exam:    Vitals: BP (!) 176/87   Pulse 68   Temp 96.8 °F (36 °C) (Oral)   Resp 20   Ht 5' 7\" (1.702 m)   Wt 108 lb 9.6 oz (49.3 kg)   SpO2 96%   BMI 17.01 kg/m²   CONSTITUTIONAL:  awake, alert, cooperative, no apparent distress and thin  EYES:  extra-ocular muscles intact  ENT:  normocepalic, without obvious abnormality  BACK:  symmetric  LUNGS:  clear to auscultation  CARDIOVASCULAR:  normal S1 and S2 and no edema  ABDOMEN:  normal bowel sounds, soft, non-distended, non-tender and no hepatosplenomegally  MUSCULOSKELETAL:  there is no redness, warmth, or swelling of the joints  NEUROLOGIC:  Mental Status Exam:  Level of Alertness:   awake  Orientation:   person  Memory:   abnormal - severe short term deficit  Fund of Knowledge:  abnormal   Cranial Nerves:  cranial nerves II-XII are grossly intact  Motor Exam:  Motor exam is symmetrical 5 out of 5 all extremities bilaterally  SKIN:  no redness, warmth, or swelling      CBC:   Recent Labs     06/13/19 2150   WBC 4.1*   HGB 11.5*        BMP:    Recent Labs     06/13/19 2150      K 4.6      CO2 25   BUN 26*   CREATININE 1.0   GLUCOSE 104     Hepatic:   Recent Labs     06/13/19 2150   AST 23   ALT 11   BILITOT 0.2*   ALKPHOS 65     CT head:  No acute ischemic infarct, hemorrhage, or mass effect.    Mild periventricular deep white matter small vessel chronic ischemic

## 2019-06-14 NOTE — DISCHARGE INSTR - COC
Continuity of Care Form    Patient Name: Judy Gallardo   :  8/3/1930  MRN:  [id number]    [facility] date:  2019  Discharge date:  2019    Code Status Order: DNR-CCA   Advance Directives:   [facility] Documentation     Date/Time Healthcare Directive Type of Healthcare Directive Copy in [address] Agent's Name Healthcare Agent's Phone Number    19 0005  Other (Comment) pt unsure -- -- -- -- --          Admitting Physician:  Willian Jacobo MD  PCP: Lj Ramos MD    Discharging Nurse: [facility] Unit/Room#: 9U-33/031-C  Discharging Unit Phone Number: [phone]    Emergency Contact:   Extended Emergency Contact Information  Primary Emergency Contact: [address] Phone: [phone]  Mobile Phone: [phone]  Relation: Child  Secondary Emergency Contact: J[address] Reyes [address] Phone: [phone]  Mobile Phone: [phone]  Relation: Child    Past Surgical History:  Past Surgical History:   Procedure Laterality Date   [address]  2017    stent    COLON SURGERY  2016    for cancer   [address]    JOINT REPLACEMENT      left knee    [place] OFFICE/OUTPT VISIT,PROCEDURE ONLY Right 2018    MOHS REPAIR BCC RIGHT NASAL TIP performed by Miguel Crook MD at [facility]         Immunization History:   Immunization History   Administered Date(s) Administered    Influenza Virus Vaccine 11/15/2015    Td, unspecified formulation 2015       Active Problems:  Patient Active Problem List   Diagnosis Code    Cellulitis, finger L03.019    No pertinent family history [id number]    Paronychia of finger L03.019    Asthma J45.909    S/P cardiac catheterization: 2017: diffuse disease in mid-LAD with multiple lesions in the 80% range. LCX non-dominant with moderate disease. RCA dominant with moderate diffuse disease. Z98.890    S/P PTCA: 4/28/2017: Stent mid-LAD Xience 3.0 x 33 mm, post-dilated 3.32 mm. Radial 70 cc. Z98.61    Confusion R41.0       Isolation/Infection:   Isolation          No Isolation            Nurse Assessment:  Last Vital Signs: BP (!) 168/73   Pulse 68   Temp 96.8 °F (36 °C) (Oral)   Resp 20   Ht 5' 7\" (1.702 m)   Wt 108 lb 9.6 oz (49.3 kg)   SpO2 96%   BMI 17.01 kg/m²     Last documented pain score (0-10 scale): Pain Level: 0  Last Weight:   Wt Readings from Last 1 Encounters:   06/13/19 108 lb 9.6 oz (49.3 kg)     Mental Status:  alert and oriented to person and date of birth. IV Access:  - None    Nursing Mobility/ADLs:  Walking   Assisted  Transfer  Assisted  Bathing  Assisted  Dressing  Assisted  Toileting  Assisted  Feeding  Independent  Med Admin  Assisted  Med Delivery   whole    Wound Care Documentation and Therapy:  Incision 09/12/18 Nose Right (Active)   Number of days: 274        Elimination:  Continence:   · Bowel: Yes  · Bladder: Yes  Urinary Catheter: None   Colostomy/Ileostomy/Ileal Conduit: No       Date of Last BM: 6/18/2019    Intake/Output Summary (Last 24 hours) at 6/14/2019 1317  Last data filed at 6/14/2019 0423  Gross per 24 hour   Intake --   Output 600 ml   Net -600 ml     I/O last 3 completed shifts:  In: -   Out: 600 [Urine:600]    Safety Concerns:     History of Falls (last 30 days)    Impairments/Disabilities:      None    Nutrition Therapy:  Current Nutrition Therapy:   - Oral Diet:  General    Routes of Feeding: Oral  Liquids: Thin Liquids  Daily Fluid Restriction: no  Last Modified Barium Swallow with Video (Video Swallowing Test): not done    Treatments at the Time of Hospital Discharge:   Respiratory Treatments: Inhaler  Oxygen Therapy:  is not on home oxygen therapy.   Ventilator:    - No ventilator support    Rehab Therapies: Physical Therapy and Occupational

## 2019-06-14 NOTE — PLAN OF CARE
Care plan reviewed with patient and daughters. Patient and daughters verbalize understanding of the plan of care and contribute to goal setting.

## 2019-06-14 NOTE — PROGRESS NOTES
Pharmacy Renal Adjustment    Jillian Vallejo is a 80 y.o. female. Pharmacy renally adjust the following medications per P&T approved policy: Lovenox    Recent Labs     06/13/19  2150   BUN 26*       Recent Labs     06/13/19  2150   CREATININE 1.0       Estimated Creatinine Clearance: 30 mL/min (based on SCr of 1 mg/dL). Calculated CrCl:    Height:   Ht Readings from Last 1 Encounters:   06/13/19 5' 7\" (1.702 m)     Weight:  Wt Readings from Last 1 Encounters:   06/13/19 108 lb 9.6 oz (49.3 kg)       CKD stage:         Baseline SCr:     Plan: Adjustments based on renal function:  Decrease Lovenox to 30 mg SubQ q24h.   Rafa Rutherford, [address] 6/14/2019 12:56 AM

## 2019-06-14 NOTE — ED NOTES
Pt to be transported in stable condition to [facility]. Floor notified prior to transport.      Vilma Darling  06/13/19 7729

## 2019-06-14 NOTE — CARE COORDINATION
6/14/19, 7:37 AM      Freddy Cabral       Admitted from: ER, patient presented due to altered mental status. 6/13/2019/ 2037 Hospital day: 0   Location: -18/018-A Reason for admit: Confusion [R41.0] Status: Obs. Admit order signed?: no  PMH:  has a past medical history of Asthma, CAD (coronary artery disease), Cancer ([address] Hypertension, S/P cardiac catheterization: 4/28/2017: diffuse disease in mid-LAD with multiple lesions in the 80% range. LCX non-dominant with moderate disease. RCA dominant with moderate diffuse disease. , and S/P PTCA: 4/28/2017: Stent mid-LAD Xience 3.0 x 33 mm, post-dilated 3.32 mm. Radial 70 cc. .  Procedure: N/A  Pertinent abnormal Imaging:CT of head and chest x-ray. Medications:  Scheduled Meds:   aspirin  81 mg Oral Daily    enoxaparin  30 mg Subcutaneous Q24H    sodium chloride flush  10 mL Intravenous 2 times per day     Continuous Infusions:   Pertinent Info/Orders/Treatment Plan:  Lovenox, prn Geodon, consult to , up with assistance. Diet: DIET GENERAL;   Smoking status:  reports that she has never smoked. She has never used smokeless tobacco.   PCP: Rosendo Wadsworth MD  Readmission: Up with assistance. Readmission Risk Score: 0%  Discharge Plan: Berta Marrufo is from home alone with dementia. She is not safe to be at home. Family planning for discharge to [facility]. She will be private pay.     Evaluation: yes

## 2019-06-15 LAB
FOLATE: 11 NG/ML (ref 4.8–24.2)
VITAMIN B-12: 678 PG/ML (ref 211–911)

## 2019-06-15 PROCEDURE — 97163 PT EVAL HIGH COMPLEX 45 MIN: CPT

## 2019-06-15 PROCEDURE — 36415 COLL VENOUS BLD VENIPUNCTURE: CPT

## 2019-06-15 PROCEDURE — 1200000000 HC SEMI PRIVATE

## 2019-06-15 PROCEDURE — 6370000000 HC RX 637 (ALT 250 FOR IP): Performed by: INTERNAL MEDICINE

## 2019-06-15 PROCEDURE — 6360000002 HC RX W HCPCS: Performed by: INTERNAL MEDICINE

## 2019-06-15 PROCEDURE — 94760 N-INVAS EAR/PLS OXIMETRY 1: CPT

## 2019-06-15 PROCEDURE — 2709999900 HC NON-CHARGEABLE SUPPLY

## 2019-06-15 PROCEDURE — 82607 VITAMIN B-12: CPT

## 2019-06-15 PROCEDURE — 2580000003 HC RX 258: Performed by: INTERNAL MEDICINE

## 2019-06-15 PROCEDURE — 94640 AIRWAY INHALATION TREATMENT: CPT

## 2019-06-15 PROCEDURE — 82746 ASSAY OF FOLIC ACID SERUM: CPT

## 2019-06-15 PROCEDURE — 97116 GAIT TRAINING THERAPY: CPT

## 2019-06-15 RX ORDER — QUETIAPINE FUMARATE 25 MG/1
25 TABLET, FILM COATED ORAL 2 TIMES DAILY
Status: DISCONTINUED | OUTPATIENT
Start: 2019-06-15 | End: 2019-06-18 | Stop reason: HOSPADM

## 2019-06-15 RX ORDER — ZIPRASIDONE MESYLATE 20 MG/ML
20 INJECTION, POWDER, LYOPHILIZED, FOR SOLUTION INTRAMUSCULAR EVERY 12 HOURS PRN
Status: DISCONTINUED | OUTPATIENT
Start: 2019-06-15 | End: 2019-06-18 | Stop reason: HOSPADM

## 2019-06-15 RX ADMIN — QUETIAPINE FUMARATE 25 MG: 25 TABLET ORAL at 19:51

## 2019-06-15 RX ADMIN — LISINOPRIL 5 MG: 5 TABLET ORAL at 08:31

## 2019-06-15 RX ADMIN — Medication 10 ML: at 10:00

## 2019-06-15 RX ADMIN — CALCIUM CARBONATE-VITAMIN D TAB 500 MG-200 UNIT 1 TABLET: 500-200 TAB at 08:31

## 2019-06-15 RX ADMIN — METOPROLOL TARTRATE 25 MG: 25 TABLET ORAL at 08:31

## 2019-06-15 RX ADMIN — WATER 1.2 ML: 1 INJECTION INTRAMUSCULAR; INTRAVENOUS; SUBCUTANEOUS at 02:12

## 2019-06-15 RX ADMIN — DONEPEZIL HYDROCHLORIDE 5 MG: 5 TABLET, FILM COATED ORAL at 19:52

## 2019-06-15 RX ADMIN — ZIPRASIDONE MESYLATE 10 MG: 20 INJECTION, POWDER, LYOPHILIZED, FOR SOLUTION INTRAMUSCULAR at 02:12

## 2019-06-15 RX ADMIN — Medication 10 ML: at 19:52

## 2019-06-15 RX ADMIN — ATORVASTATIN CALCIUM 40 MG: 40 TABLET, FILM COATED ORAL at 19:52

## 2019-06-15 RX ADMIN — OMEGA-3-ACID ETHYL ESTERS 1000 MG: 1 CAPSULE, LIQUID FILLED ORAL at 08:31

## 2019-06-15 RX ADMIN — METOPROLOL TARTRATE 25 MG: 25 TABLET ORAL at 19:52

## 2019-06-15 RX ADMIN — Medication 2 PUFF: at 08:35

## 2019-06-15 RX ADMIN — Medication 2 PUFF: at 19:45

## 2019-06-15 RX ADMIN — ASPIRIN 81 MG 81 MG: 81 TABLET ORAL at 08:31

## 2019-06-15 RX ADMIN — ENOXAPARIN SODIUM 30 MG: 30 INJECTION SUBCUTANEOUS at 08:31

## 2019-06-15 ASSESSMENT — PAIN SCALES - GENERAL
PAINLEVEL_OUTOF10: 0
PAINLEVEL_OUTOF10: 0

## 2019-06-15 NOTE — PLAN OF CARE
Problem: Mood - Altered:  Goal: Absence of abusive behavior  Description  Absence of abusive behavior  Outcome: Met This Shift     Problem: Impaired respiratory status  Goal: Clear lung sounds  6/15/2019 1647 by Seun Tolentino RN  Outcome: Met This Shift     Problem: Falls - Risk of:  Goal: Will remain free from falls  Description  Will remain free from falls  Outcome: Ongoing  Note:   Fall sign posted. Arm band in place. Non-skid slippers on. Bed alarm on. Patient not agreeable to use of call light. Call light within reach. Bed in lowest position. Sitter in room when family is not. Problem: Discharge Planning:  Goal: Ability to perform activities of daily living will improve  Description  Ability to perform activities of daily living will improve  Outcome: Ongoing     Problem: Mood - Altered:  Goal: Mood stable  Description  Mood stable  Outcome: Ongoing     Problem: Discharge Planning:  Goal: Patients continuum of care needs are met  Description  Patients continuum of care needs are met  Outcome: Ongoing  Note:   Family active in plan of care and are looking for long term placement. Will discuss with social work on Monday. Problem: Confusion - Acute:  Goal: Absence of continued neurological deterioration signs and symptoms  Description  Absence of continued neurological deterioration signs and symptoms  Outcome: Not Met This Shift  Note:   Patient remains oriented to self only. Will continue to monitor. Care plan reviewed with patient and family. Patient and family verbalize understanding of the plan of care and contribute to goal setting.

## 2019-06-15 NOTE — PROGRESS NOTES
INTERNAL MEDICINE Progress Note  6/15/2019 2:41 PM  Subjective:   Admit Date: 6/13/2019  PCP: Brooke Dickey MD  Interval History: events noted, pleasantly confused    Objective:   Vitals: BP (!) 124/56   Pulse 60   Temp 97.7 °F (36.5 °C) (Oral)   Resp 18   Ht 5' 7\" (1.702 m)   Wt 108 lb 9.6 oz (49.3 kg)   SpO2 99%   BMI 17.01 kg/m²   General appearance: alert and cooperative with exam  HEENT: Head: atraumatic  Neck: no adenopathy, no carotid bruit and no JVD  Lungs: clear to auscultation bilaterally  Heart: S1, S2 normal  Abdomen: soft, non-tender; bowel sounds normal; no masses,  no organomegaly  Extremities: extremities normal, atraumatic, no cyanosis or edema  Neurologic: Mental status: alertness: alert, orientation: person, affect: blunted, thought content exhibits loose associations, flight of ideas      Medications:   Scheduled Meds:   aspirin  81 mg Oral Daily    enoxaparin  30 mg Subcutaneous Q24H    atorvastatin  40 mg Oral Nightly    lisinopril  5 mg Oral Daily    metoprolol tartrate  25 mg Oral BID    omega-3 acid ethyl esters  1,000 mg Oral Daily    donepezil  5 mg Oral Nightly    mometasone-formoterol  2 puff Inhalation BID    calcium-vitamin D  1 tablet Oral Daily    sodium chloride flush  10 mL Intravenous 2 times per day     Continuous Infusions:    Lab Results:   CBC:   Recent Labs     06/13/19  2150   WBC 4.1*   HGB 11.5*        BMP:    Recent Labs     06/13/19  2150      K 4.6      CO2 25   BUN 26*   CREATININE 1.0   GLUCOSE 104     Hepatic:   Recent Labs     06/13/19  2150   AST 23   ALT 11   BILITOT 0.2*   ALKPHOS 65     TSH:    Lab Results   Component Value Date    TSH 6.380 06/13/2019     FOLATE:    Lab Results   Component Value Date    FOLATE 11.0 06/15/2019         Assessment and Plan:   1. Confusion / delirium  2. Dementia  3. Failure to thrive  4. CAD  5. HTN     aricept. Cont bp meds  ASA,  Lovenox.   D/w son at the bedside-family looking into long term placement    Rashaun Chen MD

## 2019-06-15 NOTE — PLAN OF CARE
Problem: Impaired respiratory status  Goal: Clear lung sounds  Outcome: Ongoing  Note:   Mdi to maintain open airways

## 2019-06-15 NOTE — PLAN OF CARE
Problem: Falls - Risk of:  Goal: Will remain free from falls  Description  Will remain free from falls  6/15/2019 0019 by Jose R Hudson RN  Outcome: Met This Shift  Note:   Fall assessment complete. Bed alarm on, bed in low position, call light and personal items within reach. Nonskid socks on. No falls noted this shift. Will continue to monitor. Problem: Falls - Risk of:  Goal: Absence of physical injury  Description  Absence of physical injury  Outcome: Met This Shift  Note:   Telesitter in room. Needs met during hourly rounding. No physical injury or harm to the patient. Problem: Injury - Risk of, Physical Injury:  Goal: Will remain free from falls  Description  Will remain free from falls  6/15/2019 0019 by Jose R Hudson RN  Outcome: Met This Shift  Note:   Fall assessment complete. Bed alarm on, bed in low position, call light and personal items within reach. Nonskid socks on. No falls noted this shift. Will continue to monitor. Problem: Injury - Risk of, Physical Injury:  Goal: Absence of physical injury  Description  Absence of physical injury  Outcome: Met This Shift  Note:   Telesitter in room. Needs met during hourly rounding. No physical injury or harm to the patient. Problem: Mood - Altered:  Goal: Absence of abusive behavior  Description  Absence of abusive behavior  Outcome: Met This Shift  Note:   No abusive behavior this shift. Problem: Sleep Pattern Disturbance:  Goal: Appears well-rested  Description  Appears well-rested  6/15/2019 0019 by Jose R Hudson RN  Outcome: Met This Shift  Note:   Patient sleeps couple hours at a time and wakes up to use the restroom.       Problem: Confusion - Acute:  Goal: Absence of continued neurological deterioration signs and symptoms  Description  Absence of continued neurological deterioration signs and symptoms  6/15/2019 0019 by Jose R Hudson RN  Outcome: Ongoing     Problem: Confusion - Acute:  Goal: Mental status will be restored to baseline  Description  Mental status will be restored to baseline  Outcome: Ongoing  Note:   Telesitter in room due to increased confusion. Problem: Mood - Altered:  Goal: Mood stable  Description  Mood stable  6/15/2019 0019 by Farhad Silverman RN  Outcome: Ongoing     Problem: Discharge Planning:  Goal: Patients continuum of care needs are met  Description  Patients continuum of care needs are met  6/15/2019 0019 by Farhad Silverman RN  Outcome: Ongoing  Note:   Dsicharge plan to home. Care plan reviewed with patient. Patient verbalized understanding of plan of care and contributed to goal setting.

## 2019-06-15 NOTE — ED PROVIDER NOTES
[address]  Pt Name: Edy Galindo  MRN: [id number]  [place] 8/3/1930  Date of evaluation: 2019  Provider: JASE Howard CNP    CHIEF COMPLAINT       Chief Complaint   Patient presents with    Altered Mental Status       Nurses Notes reviewed and I agree except as noted in the HPI. HISTORY OF PRESENT ILLNESS    Edy Galindo is a 80 y.o. female whopresents to the emergency department from home for increasing confusion. These were called to the patient's house because she was scared there was someone in her home. It was difficult to obtain an HPI from the patient to after her children arrived and they were able to piece together some information. The patient has been getting progressively more confused over the last 6 to 8 months. The family had been trying to work to get her into an assisted living or a memory care unit and the patient had been very resistant. Her daughters are at her bedside, 1 of them is her POA. They indicated that the patient had called the police for these paranoias that she has and she is seeing people in her home. She thinks that there are small children living in the in her house patient did tell me that her daughter's are 3and 10years old. Her daughters indicate that over the last few weeks her mom is been becoming more and more confused. She states that last week the patient called the / insisting that her  was in her home and he was  and he needed to come and get him. Has been is been dead for 7 years. It on my exam is very confused. She is physically well, appears hydrated, and is pleasant to interact with however she does not know the answers to most of my questions. She does divert when you try to ask a direct question. Triage notes and Nursing notes were reviewed by myself. Any discrepancies are addressed above.       REVIEW OF SYSTEMS     Review of Systems   Unable to perform ROS: Dementia          All pertinent systems were reviewed and are negative unless indicated in the HPI. PAST MEDICAL HISTORY    has a past medical history of Asthma, CAD (coronary artery disease), Cancer ([address] Hypertension, S/P cardiac catheterization: 4/28/2017: diffuse disease in mid-LAD with multiple lesions in the 80% range. LCX non-dominant with moderate disease. RCA dominant with moderate diffuse disease. , and S/P PTCA: 4/28/2017: Stent mid-LAD Xience 3.0 x 33 mm, post-dilated 3.32 mm. Radial 70 cc. .    SURGICAL HISTORY      has a past surgical history that includes joint replacement (2007); Hysterectomy (1981); eye surgery; skin biopsy; Colon surgery (05/2016); Appendectomy (1953); Cardiac catheterization (04/2017); and pr office/outpt visit,procedure only (Right, 9/12/2018). CURRENT MEDICATIONS       Current Discharge Medication List      CONTINUE these medications which have NOT CHANGED    Details   cefadroxil (DURICEF) 500 MG capsule Take 500 mg by mouth 2 times daily      lisinopril (PRINIVIL;ZESTRIL) 5 MG tablet Take 1 tablet by mouth daily  Qty: 90 tablet, Refills: 3      aspirin 81 MG chewable tablet Take 81 mg by mouth daily      atorvastatin (LIPITOR) 40 MG tablet Take 1 tablet by mouth nightly  Qty: 30 tablet, Refills: 3      nitroGLYCERIN (NITROSTAT) 0.4 MG SL tablet Place 1 tablet under the tongue every 5 minutes as needed for Chest pain up to max of 3 total doses. If no relief after 1 dose, call 911.   Qty: 25 tablet, Refills: 1      Budesonide-Formoterol Fumarate (SYMBICORT IN) Inhale into the lungs as needed      metoprolol tartrate (LOPRESSOR) 25 MG tablet Take 1 tablet by mouth 2 times daily  Qty: 180 tablet, Refills: 3      Omega-3 Fatty Acids (FISH OIL PO) Take by mouth daily       vitamin D3 (CHOLECALCIFEROL) 400 UNITS TABS tablet Take by mouth daily       Calcium Carbonate-Vitamin D (CALCIUM-VITAMIN D3 PO) Take 1,200 mg by mouth daily       Coenzyme Q10 (CO Q 10) 100 MG CAPS Take by mouth daily              ALLERGIES     is allergic to brilinta [ticagrelor] and demerol hcl [meperidine]. FAMILY HISTORY     indicated that her mother is . She indicated that her father is . She indicated that the status of her sister is unknown. She indicated that the status of her neg hx is unknown.   family history includes Cancer in her sister; High Blood Pressure in her mother; Stroke in her mother. SOCIAL HISTORY      reports that she has never smoked. She has never used smokeless tobacco. She reports that she drinks about 0.6 oz of alcohol per week. She reports that she does not use drugs. PHYSICAL EXAM     INITIAL VITALS:  height is 5' 7\" (1.702 m) and weight is 108 lb 9.6 oz (49.3 kg). Her oral temperature is 98.8 °F (37.1 °C). Her blood pressure is 135/86 and her pulse is 53. Her respiration is 18 and oxygen saturation is 96%. Physical Exam   Constitutional: She appears well-developed and well-nourished. No distress. HENT:   Head: Normocephalic and atraumatic. Eyes: Conjunctivae and EOM are normal. Right eye exhibits no discharge. Left eye exhibits no discharge. Neck: Normal range of motion. No tracheal deviation present. Cardiovascular: Normal rate, regular rhythm, normal heart sounds and intact distal pulses. Exam reveals no gallop. No murmur heard. Normal capillary refill   Pulmonary/Chest: Effort normal and breath sounds normal. No stridor. No respiratory distress. Abdominal: Soft. Bowel sounds are normal.   Musculoskeletal: Normal range of motion. She exhibits no edema, tenderness or deformity. Neurological: She is alert. She is disoriented. No cranial nerve deficit. Skin: Skin is warm and dry. No rash noted. She is not diaphoretic. No erythema. No pallor. Psychiatric: She has a normal mood and affect. Her behavior is normal.   Nursing note and vitals reviewed.         DIFFERENTIAL DIAGNOSIS:   Including but not limited to UTI, dementia, unable to care for self    DIAGNOSTIC RESULTS     EKG: AllEKG's are interpreted by the Emergency Department Physician who either signs or Co-signs this chart in the absence of a cardiologist.     EKG 12 Lead (Edited Result - FINAL)    Component (Lab Inquiry)   Collection Time Result Time Ventricular Rate Atrial Rate P-R Interval QRS Duration Q-T Interval QTc Calculation (Bazett) P Axis R Axis T Axis   06/13/19 20:48:11 06/13/19 20:48:11 70 70 244 158 466 503 80 55 138   Previous Results   08/28/18 15:06:59 08/28/18 15:06:59 53 53 270 162 528 495 81 82 103   04/29/17 05:44:43 04/29/17 09:23:42 67 67 286 166 474 500 82 10 118   04/28/17 11:02:33 04/29/17 09:23:32 49 49 290 160 532 480 82 7 126         Edited Result - FINAL                Narrative:    Sinus rhythm with 1st degree A-V block with PVCs   Possible Left atrial enlargement  Left bundle branch block  Abnormal ECG  When compared with ECG of 28-AUG-2018 15:06,  PVCs  is now Present  Confirmed by KANDI ALEX ([id number]) on 6/14/2019 11:39:43 AM                  RADIOLOGY: non-plain film images(s) such as CT, Ultrasound and MRI are read by the radiologist.  Plain radiographic images are visualized and preliminarily interpreted by the emergencyphysician unless otherwise stated below. CT HEAD WO CONTRAST   Final Result      No acute ischemic infarct, hemorrhage, or mass effect. Mild periventricular deep white matter small vessel chronic ischemic changes. **This report has been created using voice recognition software. It may contain minor errors which are inherent in voice recognition technology. **      Final report electronically signed by Dr. Mony Murillo on 6/13/2019 10:20 PM      XR CHEST PORTABLE   Final Result      No acute pneumonia. Hyperinflation. Very mild bibasilar atelectasis or scarring. **This report has been created using voice recognition software.  It may contain minor errors which are inherent in voice recognition instructions reviewed withpatient/patient representative. Patient is to follow-up with family care provider in 2-3 days if no improvement. Patient is to go to the emergency department if symptoms worsen. Patient/patient representative isaware of care plan, questions answered, verbalizes understanding and is in agreement.      ED Medications administered this visit:   Medications   sodium chloride flush 0.9 % injection 10 mL (10 mLs Intravenous Given 6/15/19 1000)   sodium chloride flush 0.9 % injection 10 mL (has no administration in time range)   hydrALAZINE (APRESOLINE) injection 10 mg (10 mg Intravenous Given 6/14/19 1701)   aspirin chewable tablet 81 mg (81 mg Oral Given 6/15/19 0831)   enoxaparin (LOVENOX) injection 30 mg (30 mg Subcutaneous Given 6/15/19 0831)   acetaminophen (TYLENOL) tablet 650 mg (has no administration in time range)   atorvastatin (LIPITOR) tablet 40 mg (40 mg Oral Given 6/14/19 2022)   lisinopril (PRINIVIL;ZESTRIL) tablet 5 mg (5 mg Oral Given 6/15/19 0831)   metoprolol tartrate (LOPRESSOR) tablet 25 mg (25 mg Oral Given 6/15/19 0831)   nitroGLYCERIN (NITROSTAT) SL tablet 0.4 mg (has no administration in time range)   omega-3 acid ethyl esters (LOVAZA) capsule 1,000 mg (1,000 mg Oral Given 6/15/19 0831)   donepezil (ARICEPT) tablet 5 mg (5 mg Oral Given 6/14/19 2022)   mometasone-formoterol (DULERA) 200-5 MCG/ACT inhaler 2 puff (2 puffs Inhalation Given 6/15/19 0835)   calcium-vitamin D (OSCAL-500) 500-200 MG-UNIT per tablet 1 tablet (1 tablet Oral Given 6/15/19 0831)   QUEtiapine (SEROQUEL) tablet 25 mg (has no administration in time range)   ziprasidone (GEODON) injection 20 mg (has no administration in time range)     And   sterile water injection 1.2 mL (has no administration in time range)           I have given the patient strict written and verbal instructions about care at home,follow-up, and signs and symptoms of worsening of condition and they did verbalize understanding. Patient was seen independently by myself. The Patient's final impression and disposition and plan was determined by myself. CRITICAL CARE:   None    CONSULTS:  Dr. Pedro Krishnamurthy:  None    FINAL IMPRESSION      1. Disorientation    2.  Dementia without behavioral disturbance, unspecified dementia type          DISPOSITION/PLAN   DISPOSITION Admitted 06/13/2019 10:59:20 PM        PATIENT REFERRED TO:  Toñito Esposito MD  [address] [facility] [address] [facility]  [phone]          [place] [phone]          DISCHARGE MEDICATIONS:  Current Discharge Medication List          (Please note that portions of this note were completed with a voice recognition program.  Efforts were made to edit thedictations but occasionally words are mis-transcribed.)    JASE Feliz CNP, APRN - CNP  06/15/19 0867

## 2019-06-15 NOTE — PROGRESS NOTES
[facility]  INPATIENT PHYSICAL THERAPY  EVALUATION  [facility] ONC [facility] 5K - 5K-18/018-A    Time In: 3528  Time Out: 1338  Timed Code Treatment Minutes: 8 Minutes  Minutes: 16          Date: 6/15/2019  Patient Name: Mihai Ann,  Gender:  female        MRN: [id number]  : 8/3/1930  (80 y.o.)      Referring Practitioner: DR. Marcelino Sutherland  Diagnosis: confusion  Additional Pertinent Hx: admit with above diagnosis, hallucinations     Past Medical History:   Diagnosis Date    Asthma     CAD (coronary artery disease)     Cancer ([facility] [address]     colon    Hypertension     S/P cardiac catheterization: 2017: diffuse disease in mid-LAD with multiple lesions in the 80% range. LCX non-dominant with moderate disease. RCA dominant with moderate diffuse disease. 2017: diffuse disease in mid-LAD with multiple lesions in the 80% range. LCX non-dominant with moderate disease. RCA dominant with moderate diffuse disease. Radial. 70 cc Dr. Valarie Arroyo S/P PTCA: 2017: Stent mid-LAD Xience 3.0 x 33 mm, post-dilated 3.32 mm. Radial 70 cc. 2017: Stent mid-LAD Xience 3.0 x 33 mm, post-dilated 3.32 mm. Radial 70 cc. Dr. Hankins Dose     Past Surgical History:   Procedure Laterality Date   [address]  2017    stent    COLON SURGERY  2016    for cancer   [address] [facility] JOINT REPLACEMENT      left knee    SD OFFICE/OUTPT VISIT,PROCEDURE ONLY Right 2018    MOHS REPAIR BCC RIGHT NASAL TIP performed by Brittany Keane MD at [address] [facility] [address]         Restrictions/Precautions:  General Precautions, Fall Risk    Subjective:  Chart Reviewed: Yes  Patient assessed for rehabilitation services?: Yes  Subjective: pleasantly confused, pt difficulty with word finding at times and inaccurate responses to questions, pt oriented to self only    General:    Pain:  Denies.   Pain Assessment  Pain Level: 0 Social/Functional History:    Lives With: Alone  Type of Home: House  Home Layout: One level  Home Access: Stairs to enter with rails  Entrance Stairs - Number of Steps: 5  Entrance Stairs - Rails: Both  Home Equipment: (no AD used PTA)   Ambulation Assistance: Independent  Transfer Assistance: Independent        Objective:       RLE AROM: WFL         LLE AROM : WFL      Strength RLE: WFL    Strength LLE: WFL      Balance  Sitting - Static: Good  Sitting - Dynamic: Good, -  Standing - Static: Fair  Standing - Dynamic: Fair, -    Rolling to Left: Supervision  Rolling to Right: Supervision  Supine to Sit: Stand by assistance  Scooting: Contact guard assistance    Transfers  Sit to Stand: Contact guard assistance  Stand to sit: Contact guard assistance       Ambulation 1  Surface: level tile  Device: No Device  Assistance: Contact guard assistance  Quality of Gait: 2 LOB with Dave to correct, pt with NBOS and scissoring at times, moderate path deviations, safety concerns and inc fall risk, cues for path finding  Distance: 150'x1, see below for inc distance      Exercises:  Comments: none             Balance Score: 6  Gait Score: 9  Tinetti Total Score: 15=high fall risk    Activity Tolerance:  Activity Tolerance: Patient Tolerated treatment well    Treatment Initiated:amb 175'x1 without AD and CGA to Dave with LOB, see deviations above. Assessment:   Body structures, Functions, Activity limitations: Decreased functional mobility , Decreased strength, Decreased safe awareness, Decreased cognition, Decreased balance  Assessment: pt lives alone with steps to enter home, pt Tinetti score of 15 which is high fall risk with 2 LOB with amb requiring Dave to recover, pt dec safety awareness and cognition, inc assist for safe mobility, pt with live sitter, recommend cont PT to inc pt functional mobility  Prognosis: Good    Clinical Presentation: High - Unstable with Unpredictable Characteristics: see assessment:    Decision Making: High Complexitybased on patient assessment and decision making process of determining plan of care and establishing reasonable expectations for measurable functional outcomes    REQUIRES PT FOLLOW UP: Yes    Discharge Recommendations:  Discharge Recommendations: Continue to assess pending progress, Subacute/Skilled Nursing Facility, Patient would benefit from continued therapy after discharge, 24 hour supervision or assist    Patient Education:  Patient Education: POC  Barriers to Learning: cognition    Equipment Recommendations: Other: cont to assess needs    Safety:  Type of devices: All fall risk precautions in place, Left in chair, Gait belt, Sitter present, Patient at risk for falls, Call light within reach, Chair alarm in place, Nurse notified    Plan:  Times per week: 3-5X GM  Times per day: Daily  Specific instructions for Next Treatment: therex, mobility, balance activities    Goals:  Patient goals : not stated, pt wanting PT to sit and visit with her  Short term goals  Time Frame for Short term goals: by discharge  Short term goal 1: bed mobility with S  Short term goal 2: transfer with SBA  Short term goal 3: amb >75'x1 with LRAD or no AD and SBA to walk safely in home  Short term goal 4: inc Tinetti score to >/=20 to demonstrate dec fall risk for safety with home mobility  Long term goals  Time Frame for Long term goals : no LTGs set seocndary to short ELOS    Evaluation Complexity: Based on the findings of patient history, examination, clinical presentation, and decision making during this evaluation, the evaluation of Kaden Bobby  is of high complexity.             AM-PAC Inpatient Mobility without Stair Climbing Raw Score : 15  AM-PAC Inpatient without Stair Climbing T-Scale Score : 43.03  Mobility Inpatient CMS 0-100% Score: 47.43  Mobility Inpatient without Stair CMS G-Code Modifier : CK

## 2019-06-16 PROCEDURE — 1200000000 HC SEMI PRIVATE

## 2019-06-16 PROCEDURE — 6360000002 HC RX W HCPCS: Performed by: INTERNAL MEDICINE

## 2019-06-16 PROCEDURE — 94640 AIRWAY INHALATION TREATMENT: CPT

## 2019-06-16 PROCEDURE — 2709999900 HC NON-CHARGEABLE SUPPLY

## 2019-06-16 PROCEDURE — 2580000003 HC RX 258: Performed by: INTERNAL MEDICINE

## 2019-06-16 PROCEDURE — 6370000000 HC RX 637 (ALT 250 FOR IP): Performed by: INTERNAL MEDICINE

## 2019-06-16 RX ORDER — DOCUSATE SODIUM 100 MG/1
100 CAPSULE, LIQUID FILLED ORAL 2 TIMES DAILY
Status: DISCONTINUED | OUTPATIENT
Start: 2019-06-16 | End: 2019-06-18 | Stop reason: HOSPADM

## 2019-06-16 RX ADMIN — Medication 2 PUFF: at 21:01

## 2019-06-16 RX ADMIN — DOCUSATE SODIUM 100 MG: 100 CAPSULE, LIQUID FILLED ORAL at 21:42

## 2019-06-16 RX ADMIN — METOPROLOL TARTRATE 25 MG: 25 TABLET ORAL at 21:42

## 2019-06-16 RX ADMIN — DONEPEZIL HYDROCHLORIDE 5 MG: 5 TABLET, FILM COATED ORAL at 21:42

## 2019-06-16 RX ADMIN — METOPROLOL TARTRATE 25 MG: 25 TABLET ORAL at 09:24

## 2019-06-16 RX ADMIN — ENOXAPARIN SODIUM 30 MG: 30 INJECTION SUBCUTANEOUS at 09:24

## 2019-06-16 RX ADMIN — QUETIAPINE FUMARATE 25 MG: 25 TABLET ORAL at 09:24

## 2019-06-16 RX ADMIN — Medication 10 ML: at 21:42

## 2019-06-16 RX ADMIN — DOCUSATE SODIUM 100 MG: 100 CAPSULE, LIQUID FILLED ORAL at 13:13

## 2019-06-16 RX ADMIN — CALCIUM CARBONATE-VITAMIN D TAB 500 MG-200 UNIT 1 TABLET: 500-200 TAB at 09:24

## 2019-06-16 RX ADMIN — ATORVASTATIN CALCIUM 40 MG: 40 TABLET, FILM COATED ORAL at 21:42

## 2019-06-16 RX ADMIN — OMEGA-3-ACID ETHYL ESTERS 1000 MG: 1 CAPSULE, LIQUID FILLED ORAL at 09:24

## 2019-06-16 RX ADMIN — LISINOPRIL 5 MG: 5 TABLET ORAL at 09:24

## 2019-06-16 RX ADMIN — Medication 2 PUFF: at 09:43

## 2019-06-16 RX ADMIN — QUETIAPINE FUMARATE 25 MG: 25 TABLET ORAL at 21:42

## 2019-06-16 RX ADMIN — Medication 10 ML: at 09:24

## 2019-06-16 RX ADMIN — ASPIRIN 81 MG 81 MG: 81 TABLET ORAL at 09:24

## 2019-06-16 ASSESSMENT — PAIN SCALES - GENERAL
PAINLEVEL_OUTOF10: 0
PAINLEVEL_OUTOF10: 0

## 2019-06-16 NOTE — PLAN OF CARE
Problem: Mood - Altered:  Goal: Mood stable  Description  Mood stable  6/16/2019 0626 by Micky Gill RN  Outcome: Met This Shift  Note:   Patient has been pleasant and polite this shift   6/15/2019 1647 by Willian Young RN  Outcome: Ongoing     Problem: Mood - Altered:  Goal: Absence of abusive behavior  Description  Absence of abusive behavior  6/16/2019 0626 by Micky Gill RN  Outcome: Met This Shift  Note:   Patient has not had any abusive behavior this shift   6/15/2019 1647 by Willian Young RN  Outcome: Met This Shift     Problem: Sleep Pattern Disturbance:  Goal: Appears well-rested  Description  Appears well-rested  Outcome: Met This Shift  Note:   Patient started on seroquel last night, patient slept entire night      Problem: Musculor/Skeletal Functional Status  Goal: Highest potential functional level  Outcome: Met This Shift  Note:   Patient ambulating with either family or staff support, patient is impulsive but gait is steady. Problem: Falls - Risk of:  Goal: Will remain free from falls  Description  Will remain free from falls  6/16/2019 0626 by Micky Gill RN  Outcome: Ongoing  Note:   Bed low, call light in reach, bed alarm on, patient is confused, alert to person only, she is impulsive, slippers on when up, patient family staying with patient, patient has to be constantly reminded to use her call light to get up   6/15/2019 1647 by Willian Young RN  Outcome: Ongoing  Note:   Fall sign posted. Arm band in place. Non-skid slippers on. Bed alarm on. Patient not agreeable to use of call light. Call light within reach. Bed in lowest position. Sitter in room when family is not.         Problem: Discharge Planning:  Goal: Ability to perform activities of daily living will improve  Description  Ability to perform activities of daily living will improve  6/16/2019 0626 by Micky Gill RN  Outcome: Ongoing  Note:   Patient confused and impulsive but is able to participate in adl's, with assistance   6/15/2019 1647 by Nafisa Ames RN  Outcome: Ongoing     Problem: Discharge Planning:  Goal: Participates in care planning  Description  Participates in care planning  6/16/2019 0626 by Eusebio Saini RN  Outcome: Ongoing  Note:   Patient family able to participate in care planning   6/15/2019 1647 by Nafisa Ames RN  Outcome: Not Met This Shift     Problem: Injury - Risk of, Physical Injury:  Goal: Will remain free from falls  Description  Will remain free from falls  6/16/2019 0626 by Eusebio Saini RN  Outcome: Ongoing  Note:   Bed low, call light in reach, bed alarm on, patient is confused, alert to person only, she is impulsive, slippers on when up, patient family staying with patient, patient has to be constantly reminded to use her call light to get up   6/15/2019 1647 by Nafisa Ames RN  Outcome: Ongoing  Note:   Fall sign posted. Arm band in place. Non-skid slippers on. Bed alarm on. Patient not agreeable to use of call light. Call light within reach. Bed in lowest position. Sitter in room when family is not. Problem: Mood - Altered:  Goal: Verbalizations of feeling emotionally comfortable while being cared for will increase  Description  Verbalizations of feeling emotionally comfortable while being cared for will increase  Outcome: Ongoing  Note:   Patient is confused, alert to person only      Problem: Discharge Planning:  Goal: Patients continuum of care needs are met  Description  Patients continuum of care needs are met  6/16/2019 0626 by Eusebio Saini RN  Outcome: Ongoing  Note:   Patient family would like for patient to got to   [facility] in [place] when availale  6/15/2019 1647 by Nafisa Ames RN  Outcome: Ongoing  Note:   Family active in plan of care and are looking for long term placement. Will discuss with social work on Monday.       Problem: Sensory Perception - Impaired:  Goal: Able to distinguish between reality-based and nonreality-based thinking  Description  Able to distinguish between reality-based and nonreality-based thinking  Outcome: Not Met This Shift     Problem: Falls - Risk of:  Goal: Absence of physical injury  Description  Absence of physical injury  Outcome: Completed     Problem: Discharge Planning:  Goal: Patients continuum of care needs are met  Description  Patients continuum of care needs are met  6/16/2019 0626 by Shelbie Boas, RN  Outcome: Ongoing  Note:   Patient family would like for patient to got to   [facility] in [place] when availale  6/15/2019 1647 by Reynaldo Ventura RN  Outcome: Ongoing  Note:   Family active in plan of care and are looking for long term placement. Will discuss with social work on Monday.       Problem: Mood - Altered:  Goal: Verbalizations of feeling emotionally comfortable while being cared for will increase  Description  Verbalizations of feeling emotionally comfortable while being cared for will increase  Outcome: Ongoing  Note:   Patient is confused, alert to person only      Problem: Impaired respiratory status  Goal: Clear lung sounds  6/16/2019 0626 by Shelbie Boas, RN  Outcome: Completed  6/15/2019 1647 by Reynaldo Ventura RN  Outcome: Met This Shift     Problem: Sensory Perception - Impaired:  Goal: Demonstrates accurate environmental perceptions  Description  Demonstrates accurate environmental perceptions  Outcome: Completed     Problem: Sensory Perception - Impaired:  Goal: Decrease in sensory misperception frequency  Description  Decrease in sensory misperception frequency  Outcome: Completed     Problem: Sensory Perception - Impaired:  Goal: Demonstrations of improved sensory functioning will increase  Description  Demonstrations of improved sensory functioning will increase  Outcome: Completed     Problem: Psychomotor Activity - Altered:  Goal: Absence of psychomotor disturbance signs and symptoms  Description  Absence of psychomotor disturbance signs and symptoms  Outcome: Completed

## 2019-06-16 NOTE — PLAN OF CARE
Problem: Mood - Altered:  Goal: Absence of abusive behavior  Description  Absence of abusive behavior  6/16/2019 1842 by Holli De La Garza RN  Outcome: Met This Shift  Note:     Problem: Impaired respiratory status  Goal: Clear lung sounds  6/15/2019 1647 by Holli De La Garza RN  Outcome: Met This Shift     Problem: Falls - Risk of:  Goal: Will remain free from falls  Description  Will remain free from falls  Outcome: Ongoing  Note:   Fall sign posted. Arm band in place. Non-skid slippers on. Bed alarm on. Patient not agreeable to use of call light. Call light within reach. Bed in lowest position. Sitter in room when family is not. Problem: Discharge Planning:  Goal: Ability to perform activities of daily living will improve  Description  Ability to perform activities of daily living will improve  Outcome: Ongoing     Problem: Mood - Altered:  Goal: Mood stable  Description  Mood stable  Outcome: Ongoing     Problem: Discharge Planning:  Goal: Patients continuum of care needs are met  Description  Patients continuum of care needs are met  Outcome: Ongoing  Note:   Family active in plan of care and are looking for long term placement. Will discuss with social work on Monday. Problem: Confusion - Acute:  Goal: Absence of continued neurological deterioration signs and symptoms  Description  Absence of continued neurological deterioration signs and symptoms  Outcome: Not Met This Shift  Note:   Patient remains oriented to self only. Will continue to monitor. Care plan reviewed with patient and family. Patient and family verbalize understanding of the plan of care and contribute to goal setting.

## 2019-06-17 PROBLEM — F02.818 ALZHEIMER'S DEMENTIA WITH BEHAVIORAL DISTURBANCE (HCC): Status: ACTIVE | Noted: 2019-06-17

## 2019-06-17 PROBLEM — G30.9 ALZHEIMER'S DEMENTIA WITH BEHAVIORAL DISTURBANCE (HCC): Status: ACTIVE | Noted: 2019-06-17

## 2019-06-17 PROCEDURE — 1200000000 HC SEMI PRIVATE

## 2019-06-17 PROCEDURE — 6370000000 HC RX 637 (ALT 250 FOR IP): Performed by: INTERNAL MEDICINE

## 2019-06-17 PROCEDURE — 2580000003 HC RX 258: Performed by: INTERNAL MEDICINE

## 2019-06-17 PROCEDURE — 97535 SELF CARE MNGMENT TRAINING: CPT

## 2019-06-17 PROCEDURE — 94640 AIRWAY INHALATION TREATMENT: CPT

## 2019-06-17 PROCEDURE — 97116 GAIT TRAINING THERAPY: CPT

## 2019-06-17 PROCEDURE — 94760 N-INVAS EAR/PLS OXIMETRY 1: CPT

## 2019-06-17 PROCEDURE — 97110 THERAPEUTIC EXERCISES: CPT

## 2019-06-17 PROCEDURE — 6360000002 HC RX W HCPCS: Performed by: INTERNAL MEDICINE

## 2019-06-17 PROCEDURE — 97166 OT EVAL MOD COMPLEX 45 MIN: CPT

## 2019-06-17 RX ORDER — QUETIAPINE FUMARATE 25 MG/1
25 TABLET, FILM COATED ORAL 2 TIMES DAILY
Qty: 60 TABLET | Refills: 3 | DISCHARGE
Start: 2019-06-18

## 2019-06-17 RX ORDER — DONEPEZIL HYDROCHLORIDE 5 MG/1
5 TABLET, FILM COATED ORAL NIGHTLY
Qty: 30 TABLET | Refills: 3 | DISCHARGE
Start: 2019-06-18

## 2019-06-17 RX ORDER — PSEUDOEPHEDRINE HCL 30 MG
100 TABLET ORAL 2 TIMES DAILY
DISCHARGE
Start: 2019-06-18

## 2019-06-17 RX ADMIN — OMEGA-3-ACID ETHYL ESTERS 1000 MG: 1 CAPSULE, LIQUID FILLED ORAL at 09:20

## 2019-06-17 RX ADMIN — DOCUSATE SODIUM 100 MG: 100 CAPSULE, LIQUID FILLED ORAL at 09:21

## 2019-06-17 RX ADMIN — LISINOPRIL 5 MG: 5 TABLET ORAL at 09:21

## 2019-06-17 RX ADMIN — CALCIUM CARBONATE-VITAMIN D TAB 500 MG-200 UNIT 1 TABLET: 500-200 TAB at 09:20

## 2019-06-17 RX ADMIN — ENOXAPARIN SODIUM 30 MG: 30 INJECTION SUBCUTANEOUS at 09:20

## 2019-06-17 RX ADMIN — METOPROLOL TARTRATE 25 MG: 25 TABLET ORAL at 20:09

## 2019-06-17 RX ADMIN — ASPIRIN 81 MG 81 MG: 81 TABLET ORAL at 09:21

## 2019-06-17 RX ADMIN — DONEPEZIL HYDROCHLORIDE 5 MG: 5 TABLET, FILM COATED ORAL at 20:09

## 2019-06-17 RX ADMIN — Medication 10 ML: at 09:24

## 2019-06-17 RX ADMIN — QUETIAPINE FUMARATE 25 MG: 25 TABLET ORAL at 09:20

## 2019-06-17 RX ADMIN — Medication 2 PUFF: at 09:45

## 2019-06-17 RX ADMIN — ATORVASTATIN CALCIUM 40 MG: 40 TABLET, FILM COATED ORAL at 20:09

## 2019-06-17 RX ADMIN — Medication 10 ML: at 21:30

## 2019-06-17 RX ADMIN — Medication 2 PUFF: at 19:52

## 2019-06-17 RX ADMIN — QUETIAPINE FUMARATE 25 MG: 25 TABLET ORAL at 20:09

## 2019-06-17 RX ADMIN — METOPROLOL TARTRATE 25 MG: 25 TABLET ORAL at 09:21

## 2019-06-17 RX ADMIN — DOCUSATE SODIUM 100 MG: 100 CAPSULE, LIQUID FILLED ORAL at 20:09

## 2019-06-17 ASSESSMENT — PAIN SCALES - GENERAL
PAINLEVEL_OUTOF10: 0

## 2019-06-17 NOTE — PLAN OF CARE
Problem: Falls - Risk of:  Goal: Will remain free from falls  Description  Will remain free from falls  Outcome: Ongoing  Note:   Pt remains free from falls this shift. Pt has a bedside tele-sitter. Bed exit alarm activated. Bed in lowest position with brakes on. Pt is forgetful. Pathway clear and possessions within reach. Call light within reach. Pt rounded on hourly. Problem: Confusion - Acute:  Goal: Absence of continued neurological deterioration signs and symptoms  Description  Absence of continued neurological deterioration signs and symptoms  Outcome: Ongoing  Note:   Pt has had continued confusion. Pt does not stay on topic and has difficulty concentrating/comprehending. Pt has a bedside tele-sitter and is monitored continuously. Problem: Discharge Planning:  Goal: Ability to perform activities of daily living will improve  Description  Ability to perform activities of daily living will improve  Outcome: Ongoing  Note:   Pt's discharge plan reviewed. Pt is from home and upon discharge, pt needs to be placed in ECF. Problem: Injury - Risk of, Physical Injury:  Goal: Will remain free from falls  Description  Will remain free from falls  Outcome: Ongoing  Note:   Pt remains free from falls this shift. Pt has a bedside tele-sitter. Bed exit alarm activated. Bed in lowest position with brakes on. Pt is forgetful. Pathway clear and possessions within reach. Call light within reach. Pt rounded on hourly. Problem: Mood - Altered:  Goal: Mood stable  Description  Mood stable  Outcome: Ongoing  Note:   Pt has not been combative and has not been crying out this shift. Care plan reviewed with patient and pt's family. Patient and pt's family verbalize understanding of the plan of care and contribute to goal setting.

## 2019-06-17 NOTE — CARE COORDINATION
6/17/19, 8:30 AM    DISCHARGE BARRIERS    SW spoke with Maddy Olivares and they will review Patient this am and will let SW know. SW spoke with [facility] Wolf and they are able to accept Patient at discharge.

## 2019-06-17 NOTE — PROGRESS NOTES
[facility] 60  INPATIENT OCCUPATIONAL THERAPY  [facility] ONC MED 5K  EVALUATION    Time:   Time In: 870  Time Out: 1413  Timed Code Treatment Minutes: 23 Minutes  Minutes: 28          Date: 2019  Patient Name: Vinod Vargas,   Gender: female      MRN: [id number]  : 8/3/1930  (80 y.o.)  Referring Practitioner: Debra Shannon MD  Diagnosis: Confusion  Additional Pertinent Hx: 81 y/o F who presents with increased confusion and hallucinations, calling 911 x3 within the past few weeks for nonexistent threats    Restrictions/Precautions:  Restrictions/Precautions: General Precautions, Fall Risk    Subjective  Chart Reviewed: Yes, Orders, Progress Notes, History and Physical  Patient assessed for rehabilitation services?: Yes  Family / Caregiver Present: No    Subjective: pleasantly confused, unable to maintain meaningful conversation however pleasant and cooperative to OT eval and treat  Comments: RN ok'd OT    Pain:  Pain Assessment  Patient Currently in Pain: Denies    Social/Functional History:  Lives With: Alone  Type of Home: House  Home Layout: One level  Home Access: Stairs to enter with rails  Entrance Stairs - Number of Steps: 5  Entrance Stairs - Rails: Both  Home Equipment: Rolling walker           ADL Assistance: Independent  Homemaking Assistance: Independent  Ambulation Assistance: Independent  Transfer Assistance: Independent          Additional Comments: pt is a questionable historian, unable to participate in meaningful history obtainment. Per ang review, pt was living alone in a single family home and anticipate pt was INDP with ADL and IADL tasks, pt did discuss owning a RW.     Cognition/Orientation:  Overall Orientation Status: Impaired  Orientation Level: Oriented to person, Disoriented to place, Disoriented to time, Disoriented to situation  Cognition Comment: decreased insight, delayed processing, cues for sequencing, task initiation, progression and completion, required single PLOF.    Performance deficits / Impairments: Decreased functional mobility , Decreased safe awareness, Decreased balance, Decreased ADL status, Decreased endurance, Decreased cognition  Prognosis: Fair  REQUIRES OT FOLLOW UP: Yes    Treatment Initiated: Treatment and education initiated within context of evaluation. Evaluation time included review of current medical information, gathering information related to past medical, social and functional history, completion of standardized testing, formal and informal observation of tasks, assessment of data and development of plan of care and goals. Treatment time included skilled education and facilitation of tasks to increase safety and independence with ADL's for improved functional independence and quality of life. Discharge Recommendations:  ECF with OT    Patient Education:  Patient Education: OT role, POC, goals, safety, ADLs, reorientation  Barriers to Learning: cognition    Equipment Recommendations:  Equipment Needed: No  Other: defer    Plan:  Times per week: 3-5x  Current Treatment Recommendations: Strengthening, Endurance Training, Balance Training, Functional Mobility Training, Safety Education & Training, Self-Care / ADL, Patient/Caregiver Education & Training, Cognitive Reorientation    Goals:  Patient goals : None stated  Short term goals  Time Frame for Short term goals: 2 weeks  Short term goal 1: Pt to complete familiar ADL tasks with no greater than min cues for increased independence with preferred occupations  Short term goal 2: Pt to demonstrate standing tolerance greater than 5 mins with 1-2 UE release at SBA in prep for standing ADLs  Short term goal 3: Pt to follow simple one step commands 100% of the time for increased indep with self care routine  Long term goals  Time Frame for Long term goals : NA d/t ELOS    Following session, patient left in safe position with all fall risk precautions in place.

## 2019-06-17 NOTE — PROGRESS NOTES
Physical Therapy   [address] ONC MED 5K - 5K-18/018-A    Time In: 1010  Time Out: 1040  Timed Code Treatment Minutes: 30 Minutes  Minutes: 30          Date: 2019  Patient Name: Nataly Friday,  Gender:  female        MRN: [id number]  : 8/3/1930  (80 y.o.)     Referring Practitioner: DR. Johan Alston  Diagnosis: confusion  Additional Pertinent Hx: admit with above diagnosis, hallucinations     Past Medical History:   Diagnosis Date    Asthma     CAD (coronary artery disease)     Cancer ([facility] [address]     colon    Hypertension     S/P cardiac catheterization: 2017: diffuse disease in mid-LAD with multiple lesions in the 80% range. LCX non-dominant with moderate disease. RCA dominant with moderate diffuse disease. 2017: diffuse disease in mid-LAD with multiple lesions in the 80% range. LCX non-dominant with moderate disease. RCA dominant with moderate diffuse disease. Radial. 70 cc Dr. Caryle Beams S/P PTCA: 2017: Stent mid-LAD Xience 3.0 x 33 mm, post-dilated 3.32 mm. Radial 70 cc. 2017: Stent mid-LAD Xience 3.0 x 33 mm, post-dilated 3.32 mm. Radial 70 cc. Dr. Joseph Posadas     Past Surgical History:   Procedure Laterality Date   [address]  2017    stent    COLON SURGERY  2016    for cancer   [address]    JOINT REPLACEMENT      left knee    VT OFFICE/OUTPT VISIT,PROCEDURE ONLY Right 2018    MOHS REPAIR BCC RIGHT NASAL TIP performed by Damaso Zhou MD at [facility]         Restrictions/Precautions:  General Precautions, Fall Risk       Prior Level of Function:  Ambulation Assistance: Independent  Transfer Assistance: Independent       Subjective:  Chart Reviewed: Yes  Family / Caregiver Present: No  Subjective: Pleasantly confused. Difficulty with word finding. Agreeable to therapy. Pain:  Denies. Social/Functional:  Type of Home: House  Home Layout: One level  Home Access: Stairs to enter with rails  Entrance Stairs - Number of Steps: 5  Entrance Stairs - Rails: Both  Home Equipment: (no AD used PTA)     Objective:  Supine to Sit: Stand by assistance  Sit to Supine: Stand by assistance    Transfers  Sit to Stand: Contact guard assistance  Stand to sit: Stand by assistance       Ambulation 1  Surface: level tile  Device: No Device  Assistance: Contact guard assistance  Quality of Gait: Min instability up on feet, especially when distracted in busy [place]. Wide SOUMYA and mod path deviations. Unsteady but no LOB  Distance: 700 feet x1. Pt wanting to take several laps around unitt. Balance  Comments: Static stand to look out window for several minutes at close Stand by Assist for safety. Exercises:  Exercises  Comments: Seated in chair pt completed BLE ankle pumps, LAQ, hip abd/add x~10 reps. Pt very difficult to keep on task. Balance Score: 6  Gait Score: 9  Tinetti Total Score: 15                   Activity Tolerance:  Activity Tolerance: Patient Tolerated treatment well    Assessment: Body structures, Functions, Activity limitations: Decreased functional mobility , Decreased strength, Decreased safe awareness, Decreased cognition, Decreased balance  Assessment: Pt tolerated session fairly well. Limited by cognition, decreased safety awareness, and decreased balance. Would benefit from continued therapy at discharge. Prognosis: Good     REQUIRES PT FOLLOW UP: Yes    Discharge Recommendations:  Discharge Recommendations: Continue to assess pending progress, Subacute/Skilled Nursing Facility, Patient would benefit from continued therapy after discharge, 24 hour supervision or assist    Patient Education:  Patient Education: POC  Barriers to Learning: cognition    Equipment Recommendations: Other: cont to assess needs    Safety:  Type of devices:  All fall risk precautions in place, Gait belt, Patient at risk for falls, Call light within reach, Chair alarm in place, Nurse notified, Bed alarm in place, Left in bed    Plan:  Times per week: 3-5X GM  Times per day: Daily  Specific instructions for Next Treatment: therex, mobility, balance activities    Goals:  Patient goals : not stated, pt wanting PT to sit and visit with her    Short term goals  Time Frame for Short term goals: by discharge  Short term goal 1: bed mobility with S  Short term goal 2: transfer with SBA  Short term goal 3: amb >75'x1 with LRAD or no AD and SBA to walk safely in home  Short term goal 4: inc Tinetti score to >/=20 to demonstrate dec fall risk for safety with home mobility    Long term goals  Time Frame for Long term goals : no LTGs set seocndary to short ELOS  If patient is discharged prior to progress note completion, this note is to serve as the discharge note with all goals being unmet unless indicated otherwise.             AM-PAC Inpatient Mobility without Stair Climbing Raw Score : 16  AM-PAC Inpatient without Stair Climbing T-Scale Score : 45.54  Mobility Inpatient CMS 0-100% Score: 40.64  Mobility Inpatient without Stair CMS G-Code Modifier : SADIA

## 2019-06-18 VITALS
WEIGHT: 108.6 LBS | TEMPERATURE: 97.5 F | HEART RATE: 64 BPM | HEIGHT: 67 IN | DIASTOLIC BLOOD PRESSURE: 77 MMHG | RESPIRATION RATE: 18 BRPM | OXYGEN SATURATION: 99 % | SYSTOLIC BLOOD PRESSURE: 188 MMHG | BODY MASS INDEX: 17.04 KG/M2

## 2019-06-18 PROBLEM — I10 ESSENTIAL HYPERTENSION: Status: ACTIVE | Noted: 2019-06-18

## 2019-06-18 PROCEDURE — 94640 AIRWAY INHALATION TREATMENT: CPT

## 2019-06-18 PROCEDURE — 6370000000 HC RX 637 (ALT 250 FOR IP): Performed by: INTERNAL MEDICINE

## 2019-06-18 PROCEDURE — 6360000002 HC RX W HCPCS: Performed by: INTERNAL MEDICINE

## 2019-06-18 RX ORDER — LISINOPRIL 10 MG/1
10 TABLET ORAL DAILY
Status: ON HOLD | DISCHARGE
Start: 2019-06-18 | End: 2019-08-16 | Stop reason: HOSPADM

## 2019-06-18 RX ADMIN — METOPROLOL TARTRATE 25 MG: 25 TABLET ORAL at 08:59

## 2019-06-18 RX ADMIN — ENOXAPARIN SODIUM 30 MG: 30 INJECTION SUBCUTANEOUS at 09:00

## 2019-06-18 RX ADMIN — QUETIAPINE FUMARATE 25 MG: 25 TABLET ORAL at 08:59

## 2019-06-18 RX ADMIN — LISINOPRIL 5 MG: 5 TABLET ORAL at 09:00

## 2019-06-18 RX ADMIN — Medication 2 PUFF: at 08:18

## 2019-06-18 RX ADMIN — CALCIUM CARBONATE-VITAMIN D TAB 500 MG-200 UNIT 1 TABLET: 500-200 TAB at 08:59

## 2019-06-18 RX ADMIN — ASPIRIN 81 MG 81 MG: 81 TABLET ORAL at 09:00

## 2019-06-18 RX ADMIN — DOCUSATE SODIUM 100 MG: 100 CAPSULE, LIQUID FILLED ORAL at 09:00

## 2019-06-18 RX ADMIN — ACETAMINOPHEN 650 MG: 325 TABLET ORAL at 02:55

## 2019-06-18 RX ADMIN — OMEGA-3-ACID ETHYL ESTERS 1000 MG: 1 CAPSULE, LIQUID FILLED ORAL at 09:00

## 2019-06-18 ASSESSMENT — PAIN SCALES - GENERAL
PAINLEVEL_OUTOF10: 2
PAINLEVEL_OUTOF10: 1
PAINLEVEL_OUTOF10: 0
PAINLEVEL_OUTOF10: 0

## 2019-06-18 NOTE — PROGRESS NOTES
Report called to Adelina Blackburn LPN @ [facility]. [phone]. Personal items verified. Patient discharged via wheelchair with daughter for transport.

## 2019-06-18 NOTE — CARE COORDINATION
6/18/19, 12:09 PM    DISCHARGE BARRIERS    Spoke with Hyacinth at [address] earlier today. They had concerns re: patient behaviors from last evening.  has RN Clarence Martin call and speak with staff at facility re: patient behavior and answer any questions that they may have. 11:21 am-received call from [address] will be able to accept. AVS faxed. Number left for report. Family to transport no other needs at this time. Patient will be skilled under her medicare benefit. Spoke with daughter and updated her on the above. Message left for Hyacinth at [address]  re: update on discharge arrangements. 6/18/19, 12:12 PM    Discharge plan discussed by  and . Discharge plan reviewed with patient/ family. Patient/ family verbalize understanding of discharge plan and are in agreement with plan. Understanding was demonstrated using the teach back method.    Services After Discharge  Services At/After Discharge: Nursing Services, PT, OT(Yash Loja)   IMM Letter  IMM Letter given to Patient/Family/Significant other/Guardian/POA/by[de-identified] updated  IMM Letter date given[de-identified] 06/18/19  IMM Letter time given[de-identified] 36  Observation Status Letter date given[de-identified] 06/13/19  Observation Status Letter time given[de-identified] 2000  Observation Status Letter given to Patient/Family/Significant other/Guardian/POA/by[de-identified] staff

## 2019-06-18 NOTE — PLAN OF CARE
Problem: Falls - Risk of:  Goal: Will remain free from falls  Description  Will remain free from falls  6/18/2019 0452 by Rush Gonzales RN  Outcome: Ongoing  Note:   Pt remains free of falls this shift. Tele sitter at bedside. Pt refused to wear non slip socks. Pt in and out of bad all night. Bed alarm on and bed in lowest position. Problem: Confusion - Acute:  Goal: Absence of continued neurological deterioration signs and symptoms  Description  Absence of continued neurological deterioration signs and symptoms  6/18/2019 0452 by Rush Gonzales RN  Outcome: Ongoing  Note:   Pt very confused through the night. Hallucinating and climbing out of bed. Problem: Discharge Planning:  Goal: Ability to perform activities of daily living will improve  Description  Ability to perform activities of daily living will improve  6/18/2019 0452 by Rush Gonzales RN  Outcome: Ongoing  Note:   Discharge plan to go to [facility] in [place]. Problem: Injury - Risk of, Physical Injury:  Goal: Will remain free from falls  Description  Will remain free from falls  6/18/2019 0452 by Rush Gonzales RN  Outcome: Ongoing  Note:   Pt remains free of falls this shift. Tele sitter at bedside. Pt refused to wear non slip socks. Pt in and out of bad all night. Bed alarm on and bed in lowest position. Problem: Mood - Altered:  Goal: Mood stable  Description  Mood stable  6/18/2019 0452 by Rush Gonzales RN  Outcome: Ongoing  Note:   Pt agitated through the night. Was able to redirect but got aggressive at times. Pt was climbing out of bed yelling most of the night. Tele sitter at bedside. Problem: Pain:  Goal: Pain level will decrease  Description  Pain level will decrease  6/18/2019 0452 by Rush Gonzales RN  Outcome: Ongoing  Note:   Pt denies pain this shift.

## 2019-06-18 NOTE — PLAN OF CARE
Problem: Falls - Risk of:  Goal: Will remain free from falls  Description  Will remain free from falls  Outcome: Ongoing   Fall assessment completed. Call light and personal items within reach. Patient is also compliant with use of non-skid slippers. Problem: Confusion - Acute:  Goal: Absence of continued neurological deterioration signs and symptoms  Description  Absence of continued neurological deterioration signs and symptoms  Outcome: Ongoing   Patient remains confused throughout shift and able to reorient easily at times. Problem: Discharge Planning:  Goal: Ability to perform activities of daily living will improve  Description  Ability to perform activities of daily living will improve  Outcome: Ongoing   Discharge plan is [facility]. Problem: Injury - Risk of, Physical Injury:  Goal: Will remain free from falls  Description  Will remain free from falls  Outcome: Ongoing   Patient remains free from injury. Problem: Mood - Altered:  Goal: Mood stable  Description  Mood stable  Outcome: Ongoing   Mood remains stable. Problem: Sensory Perception - Impaired:  Goal: Able to distinguish between reality-based and nonreality-based thinking  Description  Able to distinguish between reality-based and nonreality-based thinking  Outcome: Ongoing   Patient seeing people in room. Problem: Sleep Pattern Disturbance:  Goal: Appears well-rested  Description  Appears well-rested  Outcome: Ongoing   Patient rests periodically during shift. Problem: Discharge Planning:  Goal: Patients continuum of care needs are met  Description  Patients continuum of care needs are met  Outcome: Ongoing   Patient discharging to [facility]. Problem: Musculor/Skeletal Functional Status  Goal: Highest potential functional level  Outcome: Ongoing   Patient ambulates with standby assist. Gait steady/unsteady.      Problem: Pain:  Description  Pain management should include both nonpharmacologic and pharmacologic interventions. Goal: Pain level will decrease  Description  Pain level will decrease  Outcome: Ongoing   Patient denies pain when asked. Care plan reviewed with patient and daughter. Patient's daughter verbalized understanding of the plan of care and contribute to goal setting.

## 2019-06-18 NOTE — PROGRESS NOTES
INTERNAL MEDICINE Progress Note  6/18/2019 10:04 AM  Subjective:   Admit Date: 6/13/2019  PCP: Anamika Schaffer MD  Interval History: no new c/o    Objective:   Vitals: BP (!) 188/77   Pulse 64   Temp 97.5 °F (36.4 °C) (Oral)   Resp 18   Ht 5' 7\" (1.702 m)   Wt 108 lb 9.6 oz (49.3 kg)   SpO2 99%   BMI 17.01 kg/m²   General appearance: alert and cooperative with exam, pleasantly confused  HEENT:  atraumatic  Neck: no JVD  Lungs: clear to auscultation bilaterally  Heart: S1, S2 normal  Abdomen: soft, non-tender; bowel sounds normal; no masses,  no organomegaly  Extremities:  no cyanosis or edema  Neurologic:  alert, orientation: person, affect: blunted, thought content exhibits loose associations, flight of ideas      Medications:   Scheduled Meds:   docusate sodium  100 mg Oral BID    QUEtiapine  25 mg Oral BID    aspirin  81 mg Oral Daily    enoxaparin  30 mg Subcutaneous Q24H    atorvastatin  40 mg Oral Nightly    lisinopril  5 mg Oral Daily    metoprolol tartrate  25 mg Oral BID    omega-3 acid ethyl esters  1,000 mg Oral Daily    donepezil  5 mg Oral Nightly    mometasone-formoterol  2 puff Inhalation BID    calcium-vitamin D  1 tablet Oral Daily    sodium chloride flush  10 mL Intravenous 2 times per day     Continuous Infusions:    Lab Results:   CBC:   No results for input(s): WBC, HGB, PLT in the last 72 hours. BMP:    No results for input(s): NA, K, CL, CO2, BUN, CREATININE, GLUCOSE in the last 72 hours. Hepatic:   No results for input(s): AST, ALT, ALB, BILITOT, ALKPHOS in the last 72 hours. TSH:    Lab Results   Component Value Date    TSH 6.380 06/13/2019     FOLATE:    Lab Results   Component Value Date    FOLATE 11.0 06/15/2019         Assessment and Plan:   1. Confusion / delirium  2. Dementia  3. Failure to thrive  4. CAD  5.  HTN     Aricept.  seroquel  Cont bp meds  ASA,    Dc to SNF today    Mi Mccall MD

## 2019-06-18 NOTE — DISCHARGE SUMMARY
Discharge Summary    Judy Gallardo  :  8/3/1930  MRN:  [id number]    Admit date:  2019  Discharge date:      Admitting Physician:  Willian Jacobo MD    Discharge Diagnoses:      1. Confusion / delirium  2. Dementia  3. Failure to thrive  4. CAD  5. HTN    Patient Active Problem List   Diagnosis    Cellulitis, finger    No pertinent family history    Paronychia of finger    Asthma    S/P cardiac catheterization: 2017: diffuse disease in mid-LAD with multiple lesions in the 80% range. LCX non-dominant with moderate disease. RCA dominant with moderate diffuse disease.  S/P PTCA: 2017: Stent mid-LAD Xience 3.0 x 33 mm, post-dilated 3.32 mm. Radial 70 cc.  Confusion    Delirium    Alzheimer's dementia with behavioral disturbance    Essential hypertension       Admission Condition:  serious  Discharged Condition:  good    Hospital Course:   Patient admitted with increasing confusion in the setting of background dementia. She had no evidence of dehydration or infection. Medicated with Aricept and Seroquel for behavioral control.     Discharge Medications:       [facility] Med[facility]ructions EEB:[id number]    Printed on:19 1006   Medication Information                      aspirin 81 MG chewable tablet  Take 81 mg by mouth daily             atorvastatin (LIPITOR) 40 MG tablet  Take 1 tablet by mouth nightly             Budesonide-Formoterol Fumarate (SYMBICORT IN)  Inhale into the lungs as needed             Calcium Carbonate-Vitamin D (CALCIUM-VITAMIN D3 PO)  Take 1,200 mg by mouth daily              Coenzyme Q10 (CO Q 10) 100 MG CAPS  Take by mouth daily              docusate sodium (COLACE, DULCOLAX) 100 MG CAPS  Take 100 mg by mouth 2 times daily             donepezil (ARICEPT) 5 MG tablet  Take 1 tablet by mouth nightly             lisinopril (PRINIVIL;ZESTRIL) 10 MG tablet  Take 1 tablet by mouth daily             metoprolol tartrate (LOPRESSOR) 25

## 2019-06-20 ENCOUNTER — OUTSIDE SERVICES (OUTPATIENT)
Dept: PHYSICAL MEDICINE AND REHAB | Age: 84
End: 2019-06-20
Payer: MEDICARE

## 2019-06-20 VITALS
OXYGEN SATURATION: 98 % | RESPIRATION RATE: 20 BRPM | TEMPERATURE: 98.3 F | WEIGHT: 114 LBS | HEART RATE: 70 BPM | BODY MASS INDEX: 17.85 KG/M2 | DIASTOLIC BLOOD PRESSURE: 76 MMHG | SYSTOLIC BLOOD PRESSURE: 138 MMHG

## 2019-06-20 DIAGNOSIS — Z98.61 S/P PTCA (PERCUTANEOUS TRANSLUMINAL CORONARY ANGIOPLASTY): ICD-10-CM

## 2019-06-20 DIAGNOSIS — J45.909 ASTHMA, UNSPECIFIED ASTHMA SEVERITY, UNSPECIFIED WHETHER COMPLICATED, UNSPECIFIED WHETHER PERSISTENT: ICD-10-CM

## 2019-06-20 DIAGNOSIS — I10 ESSENTIAL HYPERTENSION: ICD-10-CM

## 2019-06-20 DIAGNOSIS — F02.81 ALZHEIMER'S DEMENTIA WITH BEHAVIORAL DISTURBANCE, UNSPECIFIED TIMING OF DEMENTIA ONSET: Primary | ICD-10-CM

## 2019-06-20 DIAGNOSIS — Z98.890 S/P CARDIAC CATHETERIZATION: ICD-10-CM

## 2019-06-20 DIAGNOSIS — G30.9 ALZHEIMER'S DEMENTIA WITH BEHAVIORAL DISTURBANCE, UNSPECIFIED TIMING OF DEMENTIA ONSET: Primary | ICD-10-CM

## 2019-06-20 DIAGNOSIS — R41.0 DELIRIUM: ICD-10-CM

## 2019-06-20 DIAGNOSIS — R41.0 CONFUSION: ICD-10-CM

## 2019-06-20 PROCEDURE — 99306 1ST NF CARE HIGH MDM 50: CPT | Performed by: PHYSICAL MEDICINE & REHABILITATION

## 2019-06-20 ASSESSMENT — ENCOUNTER SYMPTOMS
STRIDOR: 0
EYE REDNESS: 0
RECTAL PAIN: 0
VOMITING: 0
DIARRHEA: 0
FACIAL SWELLING: 0
COLOR CHANGE: 0
SORE THROAT: 0
SHORTNESS OF BREATH: 0
CHOKING: 0
ANAL BLEEDING: 0
COUGH: 0
PHOTOPHOBIA: 0
WHEEZING: 0
NAUSEA: 0
RHINORRHEA: 0
EYE ITCHING: 0
CONSTIPATION: 0
CHEST TIGHTNESS: 0

## 2019-06-20 NOTE — PROGRESS NOTES
Rgio Alcaraz is a 80 y.o. female who presents today for her medical conditions/complaints as noted below. Chief Complaint   Patient presents with    Other     Admission visit           HPI:   This patient is an 81 y/o F admitted to [facility] 6/18/19 from [facility] where she had been admitted 6/13/19 with increasing confusion in the setting of background dementia. She had no evidence of dehydration or infection. Medicated with Aricept and Seroquel for behavioral control. Hospitalization included the following diagnoses:  1. Confusion / delirium  2. Dementia  3. Failure to thrive  4. CAD  5. HTN    Today, she ia alert but not oriented. Her sister is also a patient in the facility but patient believes they are cousins. Sleeping at night. Bowels have been moving. Past Medical History:   Diagnosis Date    Asthma     CAD (coronary artery disease)     Cancer ([address]     colon    Cellulitis, finger 1/13/2014    Hypertension     No pertinent family history     Paronychia of finger 1/13/2014    S/P cardiac catheterization: 4/28/2017: diffuse disease in mid-LAD with multiple lesions in the 80% range. LCX non-dominant with moderate disease. RCA dominant with moderate diffuse disease. 4/28/2017 4/28/2017: diffuse disease in mid-LAD with multiple lesions in the 80% range. LCX non-dominant with moderate disease. RCA dominant with moderate diffuse disease. Radial. 70 cc Dr. Lolly Camacho S/P PTCA: 4/28/2017: Stent mid-LAD Xience 3.0 x 33 mm, post-dilated 3.32 mm. Radial 70 cc. 4/28/2017 4/28/2017: Stent mid-LAD Xience 3.0 x 33 mm, post-dilated 3.32 mm. Radial 70 cc.  Dr. Hadley Steven      Past Surgical History:   Procedure Laterality Date   [address]  04/2017    stent    COLON SURGERY  05/2016    for cancer   [address]    JOINT REPLACEMENT  2007    left knee    RI OFFICE/OUTPT VISIT,PROCEDURE ONLY Right 9/12/2018    MOHS REPAIR BCC RIGHT NASAL TIP performed by Nikia Crisostomo MD at [address]    SKIN BIOPSY         Family History   Problem Relation Age of Onset    Stroke Mother     High Blood Pressure Mother     Cancer Sister     Asthma Neg Hx     Diabetes Neg Hx     Heart Disease Neg Hx        Social History     Tobacco Use    Smoking status: Never Smoker    Smokeless tobacco: Never Used   Substance Use Topics    Alcohol use: Yes     Alcohol/week: 0.6 oz     Types: 1 Cans of beer per week     Comment: occ      Current Outpatient Medications   Medication Sig Dispense Refill    lisinopril (PRINIVIL;ZESTRIL) 10 MG tablet Take 1 tablet by mouth daily      docusate sodium (COLACE, DULCOLAX) 100 MG CAPS Take 100 mg by mouth 2 times daily      donepezil (ARICEPT) 5 MG tablet Take 1 tablet by mouth nightly 30 tablet 3    QUEtiapine (SEROQUEL) 25 MG tablet Take 1 tablet by mouth 2 times daily 60 tablet 3    aspirin 81 MG chewable tablet Take 81 mg by mouth daily      atorvastatin (LIPITOR) 40 MG tablet Take 1 tablet by mouth nightly 30 tablet 3    nitroGLYCERIN (NITROSTAT) 0.4 MG SL tablet Place 1 tablet under the tongue every 5 minutes as needed for Chest pain up to max of 3 total doses. If no relief after 1 dose, call 911. 25 tablet 1    Budesonide-Formoterol Fumarate (SYMBICORT IN) Inhale into the lungs as needed      metoprolol tartrate (LOPRESSOR) 25 MG tablet Take 1 tablet by mouth 2 times daily 180 tablet 3    Omega-3 Fatty Acids (FISH OIL PO) Take by mouth daily       vitamin D3 (CHOLECALCIFEROL) 400 UNITS TABS tablet Take by mouth daily       Calcium Carbonate-Vitamin D (CALCIUM-VITAMIN D3 PO) Take 1,200 mg by mouth daily       Coenzyme Q10 (CO Q 10) 100 MG CAPS Take by mouth daily        No current facility-administered medications for this visit.       Allergies   Allergen Reactions    Brilinta [Ticagrelor] Shortness Of Breath    Demerol Hcl [Meperidine] Nausea And Vomiting       Health Maintenance   Topic Date Due    Assessment/Plan:         Diagnosis Orders   1. Alzheimer's dementia with behavioral disturbance, unspecified timing of dementia onset     2. Asthma, unspecified asthma severity, unspecified whether complicated, unspecified whether persistent     3. Confusion     4. Delirium     5. Essential hypertension     6. S/P cardiac catheterization: 4/28/2017: diffuse disease in mid-LAD with multiple lesions in the 80% range. LCX non-dominant with moderate disease. RCA dominant with moderate diffuse disease. 7. S/P PTCA: 4/28/2017: Stent mid-LAD Xience 3.0 x 33 mm, post-dilated 3.32 mm. Radial 70 cc. Plan:  1. Debility following recent hospitalization for Alzheimer's disease/dementia/confusion/delirium/  agitation:  chronic; continue therapies to improve mobility and ADL's. Continue Omega-3-fatty acids, Aricept, Seroquel  2. Asthma  3. HTN--chronic and stable; continue Lopressor, Lisinopril  4. CAD--chronic and stable; continue NTG prn. ASA  5.   Hyperlipidemia--chronic and stable; continue Lipitor      Electronically signed by Grant Scanlon MD on 6/20/2019 at 10:14 AM

## 2019-07-17 ENCOUNTER — OUTSIDE SERVICES (OUTPATIENT)
Dept: FAMILY MEDICINE CLINIC | Age: 84
End: 2019-07-17
Payer: MEDICARE

## 2019-07-17 VITALS
DIASTOLIC BLOOD PRESSURE: 78 MMHG | OXYGEN SATURATION: 98 % | WEIGHT: 115 LBS | SYSTOLIC BLOOD PRESSURE: 126 MMHG | TEMPERATURE: 98.7 F | HEART RATE: 70 BPM | BODY MASS INDEX: 18.01 KG/M2 | RESPIRATION RATE: 16 BRPM

## 2019-07-17 DIAGNOSIS — J45.909 ASTHMA, UNSPECIFIED ASTHMA SEVERITY, UNSPECIFIED WHETHER COMPLICATED, UNSPECIFIED WHETHER PERSISTENT: ICD-10-CM

## 2019-07-17 DIAGNOSIS — I10 ESSENTIAL HYPERTENSION: Primary | ICD-10-CM

## 2019-07-17 DIAGNOSIS — E55.9 VITAMIN D DEFICIENCY: ICD-10-CM

## 2019-07-17 DIAGNOSIS — G30.1 LATE ONSET ALZHEIMER'S DISEASE WITH BEHAVIORAL DISTURBANCE (HCC): ICD-10-CM

## 2019-07-17 DIAGNOSIS — I25.709 CORONARY ARTERY DISEASE INVOLVING CORONARY BYPASS GRAFT OF NATIVE HEART WITH ANGINA PECTORIS (HCC): ICD-10-CM

## 2019-07-17 DIAGNOSIS — F02.818 LATE ONSET ALZHEIMER'S DISEASE WITH BEHAVIORAL DISTURBANCE (HCC): ICD-10-CM

## 2019-07-17 DIAGNOSIS — E78.5 HYPERLIPIDEMIA, UNSPECIFIED HYPERLIPIDEMIA TYPE: ICD-10-CM

## 2019-07-17 PROBLEM — I25.10 CAD (CORONARY ARTERY DISEASE): Status: ACTIVE | Noted: 2019-07-17

## 2019-07-17 PROCEDURE — 99309 SBSQ NF CARE MODERATE MDM 30: CPT | Performed by: NURSE PRACTITIONER

## 2019-07-17 RX ORDER — ACETAMINOPHEN 500 MG
500 TABLET ORAL EVERY 4 HOURS PRN
COMMUNITY

## 2019-07-17 ASSESSMENT — ENCOUNTER SYMPTOMS
CONSTIPATION: 0
VOMITING: 0
SORE THROAT: 0
COUGH: 0
NAUSEA: 0
ABDOMINAL DISTENTION: 0
DIARRHEA: 0
RHINORRHEA: 0
SHORTNESS OF BREATH: 0
WHEEZING: 0
EYE REDNESS: 0

## 2019-08-08 ENCOUNTER — OUTSIDE SERVICES (OUTPATIENT)
Dept: FAMILY MEDICINE CLINIC | Age: 84
End: 2019-08-08
Payer: MEDICARE

## 2019-08-08 VITALS
DIASTOLIC BLOOD PRESSURE: 86 MMHG | TEMPERATURE: 98.2 F | WEIGHT: 114 LBS | OXYGEN SATURATION: 98 % | SYSTOLIC BLOOD PRESSURE: 161 MMHG | BODY MASS INDEX: 17.85 KG/M2 | HEART RATE: 52 BPM | RESPIRATION RATE: 18 BRPM

## 2019-08-08 DIAGNOSIS — F02.818 LATE ONSET ALZHEIMER'S DISEASE WITH BEHAVIORAL DISTURBANCE (HCC): Primary | ICD-10-CM

## 2019-08-08 DIAGNOSIS — E55.9 VITAMIN D DEFICIENCY: ICD-10-CM

## 2019-08-08 DIAGNOSIS — G30.1 LATE ONSET ALZHEIMER'S DISEASE WITH BEHAVIORAL DISTURBANCE (HCC): Primary | ICD-10-CM

## 2019-08-08 DIAGNOSIS — R41.0 DELIRIUM: ICD-10-CM

## 2019-08-08 DIAGNOSIS — Z98.890 S/P CARDIAC CATHETERIZATION: ICD-10-CM

## 2019-08-08 DIAGNOSIS — Z98.61 S/P PTCA (PERCUTANEOUS TRANSLUMINAL CORONARY ANGIOPLASTY): ICD-10-CM

## 2019-08-08 DIAGNOSIS — E78.5 HYPERLIPIDEMIA, UNSPECIFIED HYPERLIPIDEMIA TYPE: ICD-10-CM

## 2019-08-08 DIAGNOSIS — I25.709 CORONARY ARTERY DISEASE INVOLVING CORONARY BYPASS GRAFT OF NATIVE HEART WITH ANGINA PECTORIS (HCC): ICD-10-CM

## 2019-08-08 DIAGNOSIS — J45.909 ASTHMA, UNSPECIFIED ASTHMA SEVERITY, UNSPECIFIED WHETHER COMPLICATED, UNSPECIFIED WHETHER PERSISTENT: ICD-10-CM

## 2019-08-08 DIAGNOSIS — I10 ESSENTIAL HYPERTENSION: ICD-10-CM

## 2019-08-08 DIAGNOSIS — R41.0 CONFUSION: ICD-10-CM

## 2019-08-08 PROCEDURE — 99309 SBSQ NF CARE MODERATE MDM 30: CPT | Performed by: PHYSICAL MEDICINE & REHABILITATION

## 2019-08-08 ASSESSMENT — ENCOUNTER SYMPTOMS
WHEEZING: 0
RHINORRHEA: 0
DIARRHEA: 0
CONSTIPATION: 0
ANAL BLEEDING: 0
EYE PAIN: 0
NAUSEA: 0
CHOKING: 0
EYE REDNESS: 0
VOMITING: 0
EYE DISCHARGE: 0
COUGH: 0
SORE THROAT: 0
SHORTNESS OF BREATH: 0

## 2019-08-08 NOTE — PROGRESS NOTES
in mid-LAD with multiple lesions in the 80% range. LCX non-dominant with moderate disease. RCA dominant with moderate diffuse disease. 8. S/P PTCA: 4/28/2017: Stent mid-LAD Xience 3.0 x 33 mm, post-dilated 3.32 mm. Radial 70 cc. 9. Vitamin D deficiency     10. Essential hypertension          Plan:      1. HTN-  Chronic and stable. Continue Lisinopril and Metoprolol and monitor blood pressures. 2. Alzheimer's dementia- Chronic and behaviors more in the evening. Wanders in/out of rooms. Continue Aricept and Seroquel. 3. Asthma- Chronic and stable. Continue Budesonide and monitor. 4. CAD- Chronic and stable. No chest pain. Continue Nitro and Metroprolol and Lisinopril. 5. HLD- Chronic and stable. All meds stopped. 6. Vitamin D deficiency- at risk for falls but family wanted meds stopped. Will continue therapy and monitor for falls. Spent 26 minutes interviewing and examining patient, reviewing chart, and completing documentation.       Electronically signed by Aric Rod MD on 8/8/2019 at 1:02 PM

## 2019-08-12 ENCOUNTER — HOSPITAL ENCOUNTER (INPATIENT)
Age: 84
LOS: 3 days | Discharge: HOME HEALTH | DRG: 683 | End: 2019-08-16
Attending: FAMILY MEDICINE | Admitting: INTERNAL MEDICINE
Payer: MEDICARE

## 2019-08-12 ENCOUNTER — APPOINTMENT (OUTPATIENT)
Dept: CT IMAGING | Age: 84
DRG: 683 | End: 2019-08-12
Payer: MEDICARE

## 2019-08-12 DIAGNOSIS — S02.32XA CLOSED FRACTURE OF LEFT ORBITAL FLOOR, INITIAL ENCOUNTER (HCC): ICD-10-CM

## 2019-08-12 DIAGNOSIS — E87.5 HYPERKALEMIA: ICD-10-CM

## 2019-08-12 DIAGNOSIS — N17.9 AKI (ACUTE KIDNEY INJURY) (HCC): Primary | ICD-10-CM

## 2019-08-12 DIAGNOSIS — S02.401A CLOSED FRACTURE OF MAXILLARY SINUS, INITIAL ENCOUNTER (HCC): ICD-10-CM

## 2019-08-12 DIAGNOSIS — Z91.81 AT HIGH RISK FOR FALLS: ICD-10-CM

## 2019-08-12 DIAGNOSIS — S09.90XA CLOSED HEAD INJURY, INITIAL ENCOUNTER: ICD-10-CM

## 2019-08-12 PROBLEM — S02.92XA FACIAL BONE FRACTURE (HCC): Status: ACTIVE | Noted: 2019-08-12

## 2019-08-12 PROBLEM — S22.43XA MULTIPLE FRACTURES OF RIBS, BILATERAL, INITIAL ENCOUNTER FOR CLOSED FRACTURE: Status: ACTIVE | Noted: 2019-08-12

## 2019-08-12 LAB
ALBUMIN SERPL-MCNC: 3.7 G/DL (ref 3.5–5.1)
ALP BLD-CCNC: 57 U/L (ref 38–126)
ALT SERPL-CCNC: 11 U/L (ref 11–66)
ANION GAP SERPL CALCULATED.3IONS-SCNC: 12 MEQ/L (ref 8–16)
AST SERPL-CCNC: 25 U/L (ref 5–40)
BASOPHILS # BLD: 0.6 %
BASOPHILS ABSOLUTE: 0 THOU/MM3 (ref 0–0.1)
BILIRUB SERPL-MCNC: 0.2 MG/DL (ref 0.3–1.2)
BUN BLDV-MCNC: 37 MG/DL (ref 7–22)
CALCIUM SERPL-MCNC: 8.7 MG/DL (ref 8.5–10.5)
CHLORIDE BLD-SCNC: 103 MEQ/L (ref 98–111)
CO2: 22 MEQ/L (ref 23–33)
CREAT SERPL-MCNC: 1.3 MG/DL (ref 0.4–1.2)
EOSINOPHIL # BLD: 2.8 %
EOSINOPHILS ABSOLUTE: 0.2 THOU/MM3 (ref 0–0.4)
ERYTHROCYTE [DISTWIDTH] IN BLOOD BY AUTOMATED COUNT: 13.5 % (ref 11.5–14.5)
ERYTHROCYTE [DISTWIDTH] IN BLOOD BY AUTOMATED COUNT: 51.6 FL (ref 35–45)
GFR SERPL CREATININE-BSD FRML MDRD: 39 ML/MIN/1.73M2
GLUCOSE BLD-MCNC: 108 MG/DL (ref 70–108)
HCT VFR BLD CALC: 34 % (ref 37–47)
HEMOGLOBIN: 10.6 GM/DL (ref 12–16)
IMMATURE GRANS (ABS): 0.06 THOU/MM3 (ref 0–0.07)
IMMATURE GRANULOCYTES: 1 %
LYMPHOCYTES # BLD: 15 %
LYMPHOCYTES ABSOLUTE: 1.1 THOU/MM3 (ref 1–4.8)
MCH RBC QN AUTO: 32 PG (ref 26–33)
MCHC RBC AUTO-ENTMCNC: 31.2 GM/DL (ref 32.2–35.5)
MCV RBC AUTO: 102.7 FL (ref 81–99)
MONOCYTES # BLD: 13.3 %
MONOCYTES ABSOLUTE: 0.9 THOU/MM3 (ref 0.4–1.3)
NUCLEATED RED BLOOD CELLS: 0 /100 WBC
OSMOLALITY CALCULATION: 283 MOSMOL/KG (ref 275–300)
PLATELET # BLD: 164 THOU/MM3 (ref 130–400)
PMV BLD AUTO: 10.4 FL (ref 9.4–12.4)
POTASSIUM SERPL-SCNC: 5.6 MEQ/L (ref 3.5–5.2)
RBC # BLD: 3.31 MILL/MM3 (ref 4.2–5.4)
SEG NEUTROPHILS: 67.5 %
SEGMENTED NEUTROPHILS ABSOLUTE COUNT: 4.8 THOU/MM3 (ref 1.8–7.7)
SODIUM BLD-SCNC: 137 MEQ/L (ref 135–145)
TOTAL PROTEIN: 6.2 G/DL (ref 6.1–8)
WBC # BLD: 7.1 THOU/MM3 (ref 4.8–10.8)

## 2019-08-12 PROCEDURE — 72125 CT NECK SPINE W/O DYE: CPT

## 2019-08-12 PROCEDURE — 70486 CT MAXILLOFACIAL W/O DYE: CPT

## 2019-08-12 PROCEDURE — 70450 CT HEAD/BRAIN W/O DYE: CPT

## 2019-08-12 PROCEDURE — G0378 HOSPITAL OBSERVATION PER HR: HCPCS

## 2019-08-12 PROCEDURE — 85025 COMPLETE CBC W/AUTO DIFF WBC: CPT

## 2019-08-12 PROCEDURE — 99285 EMERGENCY DEPT VISIT HI MDM: CPT

## 2019-08-12 PROCEDURE — 36415 COLL VENOUS BLD VENIPUNCTURE: CPT

## 2019-08-12 PROCEDURE — 2709999900 HC NON-CHARGEABLE SUPPLY

## 2019-08-12 PROCEDURE — 80053 COMPREHEN METABOLIC PANEL: CPT

## 2019-08-12 PROCEDURE — 6370000000 HC RX 637 (ALT 250 FOR IP): Performed by: NURSE PRACTITIONER

## 2019-08-12 PROCEDURE — 99222 1ST HOSP IP/OBS MODERATE 55: CPT | Performed by: NURSE PRACTITIONER

## 2019-08-12 RX ORDER — DOCUSATE SODIUM 100 MG/1
100 CAPSULE, LIQUID FILLED ORAL 2 TIMES DAILY
Status: DISCONTINUED | OUTPATIENT
Start: 2019-08-12 | End: 2019-08-13 | Stop reason: ALTCHOICE

## 2019-08-12 RX ORDER — ACETAMINOPHEN 500 MG
500 TABLET ORAL EVERY 4 HOURS PRN
Status: DISCONTINUED | OUTPATIENT
Start: 2019-08-12 | End: 2019-08-16 | Stop reason: HOSPADM

## 2019-08-12 RX ORDER — ONDANSETRON 2 MG/ML
4 INJECTION INTRAMUSCULAR; INTRAVENOUS EVERY 6 HOURS PRN
Status: DISCONTINUED | OUTPATIENT
Start: 2019-08-12 | End: 2019-08-16 | Stop reason: HOSPADM

## 2019-08-12 RX ORDER — SODIUM CHLORIDE 0.9 % (FLUSH) 0.9 %
10 SYRINGE (ML) INJECTION PRN
Status: DISCONTINUED | OUTPATIENT
Start: 2019-08-12 | End: 2019-08-16 | Stop reason: HOSPADM

## 2019-08-12 RX ORDER — METOPROLOL TARTRATE 50 MG/1
25 TABLET, FILM COATED ORAL 2 TIMES DAILY
Status: DISCONTINUED | OUTPATIENT
Start: 2019-08-12 | End: 2019-08-16 | Stop reason: HOSPADM

## 2019-08-12 RX ORDER — ASPIRIN 81 MG/1
81 TABLET, CHEWABLE ORAL DAILY
Status: DISCONTINUED | OUTPATIENT
Start: 2019-08-12 | End: 2019-08-16 | Stop reason: HOSPADM

## 2019-08-12 RX ORDER — QUETIAPINE FUMARATE 25 MG/1
25 TABLET, FILM COATED ORAL 2 TIMES DAILY
Status: DISCONTINUED | OUTPATIENT
Start: 2019-08-12 | End: 2019-08-16 | Stop reason: HOSPADM

## 2019-08-12 RX ORDER — SODIUM CHLORIDE 0.9 % (FLUSH) 0.9 %
10 SYRINGE (ML) INJECTION EVERY 12 HOURS SCHEDULED
Status: DISCONTINUED | OUTPATIENT
Start: 2019-08-12 | End: 2019-08-16 | Stop reason: HOSPADM

## 2019-08-12 RX ORDER — SODIUM CHLORIDE 9 MG/ML
INJECTION, SOLUTION INTRAVENOUS CONTINUOUS
Status: DISCONTINUED | OUTPATIENT
Start: 2019-08-12 | End: 2019-08-16 | Stop reason: HOSPADM

## 2019-08-12 RX ORDER — ACETAMINOPHEN 325 MG/1
650 TABLET ORAL EVERY 4 HOURS PRN
Status: DISCONTINUED | OUTPATIENT
Start: 2019-08-12 | End: 2019-08-16 | Stop reason: HOSPADM

## 2019-08-12 RX ORDER — DONEPEZIL HYDROCHLORIDE 5 MG/1
5 TABLET, FILM COATED ORAL NIGHTLY
Status: DISCONTINUED | OUTPATIENT
Start: 2019-08-12 | End: 2019-08-16 | Stop reason: HOSPADM

## 2019-08-12 RX ADMIN — METOPROLOL TARTRATE 25 MG: 50 TABLET, FILM COATED ORAL at 21:04

## 2019-08-12 RX ADMIN — QUETIAPINE FUMARATE 25 MG: 25 TABLET ORAL at 21:04

## 2019-08-12 RX ADMIN — ACETAMINOPHEN 650 MG: 325 TABLET ORAL at 21:04

## 2019-08-12 RX ADMIN — DONEPEZIL HYDROCHLORIDE 5 MG: 5 TABLET, FILM COATED ORAL at 21:04

## 2019-08-12 ASSESSMENT — PAIN SCALES - GENERAL: PAINLEVEL_OUTOF10: 7

## 2019-08-12 NOTE — LETTER
1-800- MEDICARE (7-917.828.6981). TTY users should call 9-106.167.1560. · To find a different doctor, visit Medicare's Physician Compare website, HDTapes.co.nz, or call 1-800-MEDICARE (885 5991). TTY users should call 2-520.676.8800. · To find a different skilled nursing facility, visit [employer] website, https://www.[employer]plex/, or call 1-800-MEDICARE (1- 789.185.9608). TTY users should call 1-833.380.2141. · To find a different long term care hospital, visit [facility] [address] Compare website, [employer], or call 1-800- MEDICARE (594 2846). TTY users should call 2-748.178.2419. · To find a different inpatient rehabilitation facility, visit [facility] [address] Compare website, www.medicare.gov/ inpatientrehabilitation facilitycompare, or call 1-800-MEDICARE (3-954.523.2893). TTY users should call 9- 638.472.3931. · To find a different home health agency, visit [address] website, www.medicare.gov/homehealthcompare, or call 1-800-MEDICARE (1-665- 826-1189). TTY users should call 5-214.496.7077.

## 2019-08-13 LAB
ANION GAP SERPL CALCULATED.3IONS-SCNC: 16 MEQ/L (ref 8–16)
BACTERIA: ABNORMAL /HPF
BASOPHILS # BLD: 0.2 %
BASOPHILS ABSOLUTE: 0 THOU/MM3 (ref 0–0.1)
BILIRUBIN URINE: NEGATIVE
BLOOD, URINE: NEGATIVE
BUN BLDV-MCNC: 26 MG/DL (ref 7–22)
CALCIUM SERPL-MCNC: 8.7 MG/DL (ref 8.5–10.5)
CASTS 2: ABNORMAL /LPF
CASTS UA: ABNORMAL /LPF
CHARACTER, URINE: CLEAR
CHLORIDE BLD-SCNC: 104 MEQ/L (ref 98–111)
CO2: 20 MEQ/L (ref 23–33)
COLOR: YELLOW
CREAT SERPL-MCNC: 0.9 MG/DL (ref 0.4–1.2)
CREATININE URINE: 94.2 MG/DL
CRYSTALS, UA: ABNORMAL
EOSINOPHIL # BLD: 0.1 %
EOSINOPHILS ABSOLUTE: 0 THOU/MM3 (ref 0–0.4)
EPITHELIAL CELLS, UA: ABNORMAL /HPF
ERYTHROCYTE [DISTWIDTH] IN BLOOD BY AUTOMATED COUNT: 13.3 % (ref 11.5–14.5)
ERYTHROCYTE [DISTWIDTH] IN BLOOD BY AUTOMATED COUNT: 49.8 FL (ref 35–45)
GFR SERPL CREATININE-BSD FRML MDRD: 59 ML/MIN/1.73M2
GLUCOSE BLD-MCNC: 107 MG/DL (ref 70–108)
GLUCOSE URINE: NEGATIVE MG/DL
HCT VFR BLD CALC: 32.9 % (ref 37–47)
HEMOGLOBIN: 10.6 GM/DL (ref 12–16)
IMMATURE GRANS (ABS): 0.04 THOU/MM3 (ref 0–0.07)
IMMATURE GRANULOCYTES: 1 %
KETONES, URINE: NEGATIVE
LEUKOCYTE ESTERASE, URINE: ABNORMAL
LYMPHOCYTES # BLD: 9.5 %
LYMPHOCYTES ABSOLUTE: 0.8 THOU/MM3 (ref 1–4.8)
MCH RBC QN AUTO: 32.5 PG (ref 26–33)
MCHC RBC AUTO-ENTMCNC: 32.2 GM/DL (ref 32.2–35.5)
MCV RBC AUTO: 100.9 FL (ref 81–99)
MISCELLANEOUS 2: ABNORMAL
MONOCYTES # BLD: 11.2 %
MONOCYTES ABSOLUTE: 0.9 THOU/MM3 (ref 0.4–1.3)
NITRITE, URINE: NEGATIVE
NUCLEATED RED BLOOD CELLS: 0 /100 WBC
PH UA: 6 (ref 5–9)
PLATELET # BLD: 167 THOU/MM3 (ref 130–400)
PMV BLD AUTO: 10.4 FL (ref 9.4–12.4)
POTASSIUM REFLEX MAGNESIUM: 4.3 MEQ/L (ref 3.5–5.2)
PROTEIN UA: ABNORMAL
RBC # BLD: 3.26 MILL/MM3 (ref 4.2–5.4)
RBC URINE: ABNORMAL /HPF
RENAL EPITHELIAL, UA: ABNORMAL
SEG NEUTROPHILS: 78.5 %
SEGMENTED NEUTROPHILS ABSOLUTE COUNT: 6.4 THOU/MM3 (ref 1.8–7.7)
SODIUM BLD-SCNC: 140 MEQ/L (ref 135–145)
SODIUM URINE: 62 MEQ/L
SPECIFIC GRAVITY, URINE: 1.02 (ref 1–1.03)
UROBILINOGEN, URINE: 0.2 EU/DL (ref 0–1)
WBC # BLD: 8.1 THOU/MM3 (ref 4.8–10.8)
WBC UA: ABNORMAL /HPF
YEAST: ABNORMAL

## 2019-08-13 PROCEDURE — 85025 COMPLETE CBC W/AUTO DIFF WBC: CPT

## 2019-08-13 PROCEDURE — 6370000000 HC RX 637 (ALT 250 FOR IP): Performed by: NURSE PRACTITIONER

## 2019-08-13 PROCEDURE — 82570 ASSAY OF URINE CREATININE: CPT

## 2019-08-13 PROCEDURE — 99233 SBSQ HOSP IP/OBS HIGH 50: CPT | Performed by: INTERNAL MEDICINE

## 2019-08-13 PROCEDURE — 80048 BASIC METABOLIC PNL TOTAL CA: CPT

## 2019-08-13 PROCEDURE — 2580000003 HC RX 258: Performed by: NURSE PRACTITIONER

## 2019-08-13 PROCEDURE — 81001 URINALYSIS AUTO W/SCOPE: CPT

## 2019-08-13 PROCEDURE — 6370000000 HC RX 637 (ALT 250 FOR IP): Performed by: INTERNAL MEDICINE

## 2019-08-13 PROCEDURE — 97116 GAIT TRAINING THERAPY: CPT

## 2019-08-13 PROCEDURE — 36415 COLL VENOUS BLD VENIPUNCTURE: CPT

## 2019-08-13 PROCEDURE — 1200000003 HC TELEMETRY R&B

## 2019-08-13 PROCEDURE — 97162 PT EVAL MOD COMPLEX 30 MIN: CPT

## 2019-08-13 PROCEDURE — 1200000000 HC SEMI PRIVATE

## 2019-08-13 PROCEDURE — 2709999900 HC NON-CHARGEABLE SUPPLY

## 2019-08-13 PROCEDURE — 84300 ASSAY OF URINE SODIUM: CPT

## 2019-08-13 RX ORDER — AMLODIPINE BESYLATE 5 MG/1
5 TABLET ORAL DAILY
Status: DISCONTINUED | OUTPATIENT
Start: 2019-08-13 | End: 2019-08-16 | Stop reason: HOSPADM

## 2019-08-13 RX ADMIN — DOCUSATE SODIUM 100 MG: 100 CAPSULE, LIQUID FILLED ORAL at 09:01

## 2019-08-13 RX ADMIN — SODIUM CHLORIDE: 9 INJECTION, SOLUTION INTRAVENOUS at 08:50

## 2019-08-13 RX ADMIN — ACETAMINOPHEN 650 MG: 325 TABLET ORAL at 15:20

## 2019-08-13 RX ADMIN — METOPROLOL TARTRATE 25 MG: 50 TABLET, FILM COATED ORAL at 10:32

## 2019-08-13 RX ADMIN — AMLODIPINE BESYLATE 5 MG: 5 TABLET ORAL at 12:01

## 2019-08-13 RX ADMIN — QUETIAPINE FUMARATE 25 MG: 25 TABLET ORAL at 10:33

## 2019-08-13 RX ADMIN — SODIUM CHLORIDE, PRESERVATIVE FREE 10 ML: 5 INJECTION INTRAVENOUS at 09:01

## 2019-08-13 RX ADMIN — MAGNESIUM HYDROXIDE 30 ML: 400 SUSPENSION ORAL at 10:35

## 2019-08-13 RX ADMIN — ASPIRIN 81 MG 81 MG: 81 TABLET ORAL at 09:01

## 2019-08-13 ASSESSMENT — PAIN SCALES - GENERAL
PAINLEVEL_OUTOF10: 4
PAINLEVEL_OUTOF10: 0
PAINLEVEL_OUTOF10: 0
PAINLEVEL_OUTOF10: 3
PAINLEVEL_OUTOF10: 2
PAINLEVEL_OUTOF10: 4

## 2019-08-13 NOTE — PROGRESS NOTES
[address] [address]  INPATIENT PHYSICAL THERAPY  EVALUATION  [facility] ONC MED 5K - 5K-13/013-A    Time In: 4849  Time Out: 1140  Timed Code Treatment Minutes: 8 Minutes  Minutes: 16          Date: 2019  Patient Name: Jessica English,  Gender:  female        MRN: [id number]  : 8/3/1930  (80 y.o.)      Referring Practitioner: Rubia Lopes CNP  Diagnosis: NINO  Additional Pertinent Hx: admit with above diagnosis, multiple falls, bilateral first rib fractures and post left second and third ribs, NINO, advanced dementia     Restrictions/Precautions:  Restrictions/Precautions: General Precautions, Fall Risk    Subjective:  Chart Reviewed: Yes  Patient assessed for rehabilitation services?: Yes  Family / Caregiver Present: Yes  Subjective: cooperative, pleasantly confused    General:                      Pain:  Denies( per dtr pt had c/o left shoulder pain early today). Pain Assessment  Pain Level: 0       Social/Functional History:    Type of Home: Facility  Home Equipment: Rolling walker(used PTA per pt)                                   OBJECTIVE:  Range of Motion:  Right Lower Extremity: WFL  Left Lower Extremity: WFL    Strength:  Right Lower Extremity: WFL  Left Lower Extremity: WFL    Balance:  Static Sitting Balance:  Stand By Assistance  Static Standing Balance: Contact Guard Assistance, with RW,     Bed Mobility:  Rolling to Left: Stand By Assistance   Supine to Sit: Contact Guard Assistance  Scooting: Stand By Assistance    Transfers:  Sit to Stand: Contact Guard Assistance  Stand to [facility], cues to keep walker close for safety prior to sitting in chair    Ambulation:  Contact Guard Assistance  Distance: 6'x1, 225'x1  Surface: Level Tile  Device:Rolling Walker  Gait Deviations:   Forward Flexed Posture and cues for direction, 2 path deviations with CGA pt able to self correct        Functional Outcome Measures: Completed  AM-PAC Inpatient Mobility without Stair Climbing Raw Score :

## 2019-08-13 NOTE — PLAN OF CARE
Problem: Pain:  Goal: Pain level will decrease  Description  Pain level will decrease  Outcome: Ongoing  Note:   Patient had multiple falls in the nursing home this week. Patient has dementia, was very confused and combative upon admission. PRN tylenol given once patient would swallow the pill. Pt was repositioned with pillows for comfort. Problem: Falls - Risk of:  Goal: Will remain free from falls  Description  Will remain free from falls  Outcome: Ongoing  Note:   Patient is a high fall risk. Has had multiple falls this week and the nursing home. Pt is confused. All safety measures being used. Non slip socks on, bed in lowest position, 3/4 side rails up, fall sign posted, bed alarm on.  at bedside. Problem: Risk for Impaired Skin Integrity  Goal: Tissue integrity - skin and mucous membranes  Description  Structural intactness and normal physiological function of skin and  mucous membranes. Outcome: Ongoing  Note:   Patient has abrasion under left eye, also scattered bruising all over body. Redness on buttocks that is blanchable. Problem: Discharge Planning:  Goal: Discharged to appropriate level of care  Description  Discharged to appropriate level of care  Outcome: Ongoing  Note:   Discharge plans to return to [facility]. Problem: Safety:  Goal: Ability to remain free from injury will improve  Description  Ability to remain free from injury will improve  Outcome: Ongoing  Note:   Patient has dementia. She is verbal and combative at times. All safety measures in place this shift. Care plan reviewed with patients daughter. Patients daughter verbalizes understanding of the plan of care and will contribute to goal setting.

## 2019-08-13 NOTE — DISCHARGE INSTR - COC
artery disease) I25.10    Hyperlipidemia E78.5    Vitamin D deficiency E55.9    NINO (acute kidney injury) ([facility] [address] N17.9    Multiple fractures of ribs, bilateral, initial encounter for closed fracture S22.43XA    Facial bone fracture ([facility] [address] S02. 92XA    At high risk for falls Z91.81    Closed fracture of maxillary sinus (HCC) S02.401A    Closed head injury S09.90XA    Hyperkalemia E87.5       Isolation/Infection:   Isolation          No Isolation            Nurse Assessment:  Last Vital Signs: BP (!) 142/65   Pulse 65   Temp 97.9 °F (36.6 °C) (Oral)   Resp 20   Wt 111 lb (50.3 kg)   SpO2 100%   BMI 17.39 kg/m²     Last documented pain score (0-10 scale): Pain Level: 3  Last Weight:   Wt Readings from Last 1 Encounters:   08/12/19 111 lb (50.3 kg)     Mental Status:  Alert, oriented to name only. IV Access:  - None    Nursing Mobility/ADLs:  Walking   Assisted  Transfer  Assisted  Bathing  Assisted  Dressing  Assisted  Toileting  Assisted  Feeding  Assisted  Med Admin  Assisted  Med Delivery   whole and prefers mixed with pudding    Wound Care Documentation and Therapy:  Incision 09/12/18 Nose Right (Active)   Number of days: 334        Elimination:  Continence:   · Bowel: yes  · Bladder: Yes  Urinary Catheter: None   Colostomy/Ileostomy/Ileal Conduit: No       Date of Last BM: 8/16/2019    Intake/Output Summary (Last 24 hours) at 8/13/2019 1038  Last data filed at 8/13/2019 0901  Gross per 24 hour   Intake 10 ml   Output --   Net 10 ml     No intake/output data recorded. Safety Concerns:     History of Falls (last 30 days)    Impairments/Disabilities:      None    Nutrition Therapy:  Current Nutrition Therapy:   - Oral Diet:  General    Routes of Feeding: Oral  Liquids:  Thin Liquids  Daily Fluid Restriction: no  Last Modified Barium Swallow with Video (Video Swallowing Test): not done    Treatments at the Time of Hospital Discharge:   Respiratory Treatments: N/A  Oxygen Therapy:  is on oxygen at 2

## 2019-08-13 NOTE — PROGRESS NOTES
08/12/2019    ALKPHOS 57 08/12/2019         Assessment/Plan:  1. Multiple falls: noted old facial fractures, noted bilateral first ribs and posterior left second and third ribs. start on IS and symptomatic control of pain as needed   2. Acute kidney injury: improving creatinine has normalized, will d/c Lisinopril and start on Amlodipine 5mg po daily   3. Hyperkalemia:  improved  4. Non anion gap metabolic acidosis: ? Dehydration, advance dementia, monitor. cont. Fluids at 75 ml/hr    5. Macrocytic anemia: chronic, no active bleeding, cont ASA   6. Advanced dementia: cont. Aricept and  Seroquel,   7.  Hypertension: Lopressor 25 mg BID   8. CAD:  daily ASA continued, stable        Electronically signed by Davin Singh MD on 8/13/2019 at 1:53 PM    Rounding Hospitalist

## 2019-08-13 NOTE — PROGRESS NOTES
Pt is confused and combative.  at bedside. Pt refusing IV fluid and telemetry monitoring. Will continue to monitor and attempt again.

## 2019-08-14 LAB
ALLEN TEST: POSITIVE
ANION GAP SERPL CALCULATED.3IONS-SCNC: 9 MEQ/L (ref 8–16)
BASE EXCESS (CALCULATED): -1.6 MMOL/L (ref -2.5–2.5)
BUN BLDV-MCNC: 21 MG/DL (ref 7–22)
CALCIUM SERPL-MCNC: 8.3 MG/DL (ref 8.5–10.5)
CHLORIDE BLD-SCNC: 109 MEQ/L (ref 98–111)
CO2: 20 MEQ/L (ref 23–33)
COLLECTED BY:: ABNORMAL
CREAT SERPL-MCNC: 0.8 MG/DL (ref 0.4–1.2)
DEVICE: ABNORMAL
ERYTHROCYTE [DISTWIDTH] IN BLOOD BY AUTOMATED COUNT: 13.4 % (ref 11.5–14.5)
ERYTHROCYTE [DISTWIDTH] IN BLOOD BY AUTOMATED COUNT: 50.3 FL (ref 35–45)
GFR SERPL CREATININE-BSD FRML MDRD: 68 ML/MIN/1.73M2
GLUCOSE BLD-MCNC: 103 MG/DL (ref 70–108)
HCO3: 22 MMOL/L (ref 23–28)
HCT VFR BLD CALC: 31.1 % (ref 37–47)
HEMOGLOBIN: 9.9 GM/DL (ref 12–16)
MCH RBC QN AUTO: 32.4 PG (ref 26–33)
MCHC RBC AUTO-ENTMCNC: 31.8 GM/DL (ref 32.2–35.5)
MCV RBC AUTO: 101.6 FL (ref 81–99)
O2 SATURATION: 92 %
PCO2: 34 MMHG (ref 35–45)
PH BLOOD GAS: 7.43 (ref 7.35–7.45)
PLATELET # BLD: 143 THOU/MM3 (ref 130–400)
PMV BLD AUTO: 10.2 FL (ref 9.4–12.4)
PO2: 62 MMHG (ref 71–104)
POTASSIUM SERPL-SCNC: 4.5 MEQ/L (ref 3.5–5.2)
RBC # BLD: 3.06 MILL/MM3 (ref 4.2–5.4)
SODIUM BLD-SCNC: 138 MEQ/L (ref 135–145)
SOURCE, BLOOD GAS: ABNORMAL
WBC # BLD: 6.8 THOU/MM3 (ref 4.8–10.8)

## 2019-08-14 PROCEDURE — 1200000003 HC TELEMETRY R&B

## 2019-08-14 PROCEDURE — 97167 OT EVAL HIGH COMPLEX 60 MIN: CPT

## 2019-08-14 PROCEDURE — 99233 SBSQ HOSP IP/OBS HIGH 50: CPT | Performed by: INTERNAL MEDICINE

## 2019-08-14 PROCEDURE — 2709999900 HC NON-CHARGEABLE SUPPLY

## 2019-08-14 PROCEDURE — 1200000000 HC SEMI PRIVATE

## 2019-08-14 PROCEDURE — 6360000002 HC RX W HCPCS: Performed by: INTERNAL MEDICINE

## 2019-08-14 PROCEDURE — 82803 BLOOD GASES ANY COMBINATION: CPT

## 2019-08-14 PROCEDURE — 36600 WITHDRAWAL OF ARTERIAL BLOOD: CPT

## 2019-08-14 PROCEDURE — 85027 COMPLETE CBC AUTOMATED: CPT

## 2019-08-14 PROCEDURE — 94760 N-INVAS EAR/PLS OXIMETRY 1: CPT

## 2019-08-14 PROCEDURE — 6370000000 HC RX 637 (ALT 250 FOR IP): Performed by: NURSE PRACTITIONER

## 2019-08-14 PROCEDURE — 97530 THERAPEUTIC ACTIVITIES: CPT

## 2019-08-14 PROCEDURE — 2700000000 HC OXYGEN THERAPY PER DAY

## 2019-08-14 PROCEDURE — 97110 THERAPEUTIC EXERCISES: CPT

## 2019-08-14 PROCEDURE — 80048 BASIC METABOLIC PNL TOTAL CA: CPT

## 2019-08-14 PROCEDURE — 36415 COLL VENOUS BLD VENIPUNCTURE: CPT

## 2019-08-14 PROCEDURE — 6370000000 HC RX 637 (ALT 250 FOR IP): Performed by: INTERNAL MEDICINE

## 2019-08-14 PROCEDURE — 2580000003 HC RX 258: Performed by: NURSE PRACTITIONER

## 2019-08-14 PROCEDURE — 97116 GAIT TRAINING THERAPY: CPT

## 2019-08-14 RX ORDER — HALOPERIDOL 5 MG
5 TABLET ORAL ONCE
Status: DISCONTINUED | OUTPATIENT
Start: 2019-08-14 | End: 2019-08-14

## 2019-08-14 RX ORDER — HALOPERIDOL 5 MG/ML
5 INJECTION INTRAMUSCULAR ONCE
Status: COMPLETED | OUTPATIENT
Start: 2019-08-14 | End: 2019-08-14

## 2019-08-14 RX ADMIN — METOPROLOL TARTRATE 25 MG: 50 TABLET, FILM COATED ORAL at 04:09

## 2019-08-14 RX ADMIN — ACETAMINOPHEN 500 MG: 500 TABLET ORAL at 12:30

## 2019-08-14 RX ADMIN — ACETAMINOPHEN 500 MG: 500 TABLET ORAL at 20:28

## 2019-08-14 RX ADMIN — QUETIAPINE FUMARATE 25 MG: 25 TABLET ORAL at 08:03

## 2019-08-14 RX ADMIN — DOCUSATE SODIUM 100 MG: 50 LIQUID ORAL at 08:03

## 2019-08-14 RX ADMIN — AMLODIPINE BESYLATE 5 MG: 5 TABLET ORAL at 08:03

## 2019-08-14 RX ADMIN — MAGNESIUM HYDROXIDE 30 ML: 400 SUSPENSION ORAL at 08:03

## 2019-08-14 RX ADMIN — HALOPERIDOL LACTATE 5 MG: 5 INJECTION INTRAMUSCULAR at 18:41

## 2019-08-14 RX ADMIN — ASPIRIN 81 MG 81 MG: 81 TABLET ORAL at 08:03

## 2019-08-14 RX ADMIN — SODIUM CHLORIDE: 9 INJECTION, SOLUTION INTRAVENOUS at 10:23

## 2019-08-14 RX ADMIN — METOPROLOL TARTRATE 25 MG: 50 TABLET, FILM COATED ORAL at 08:03

## 2019-08-14 RX ADMIN — DONEPEZIL HYDROCHLORIDE 5 MG: 5 TABLET, FILM COATED ORAL at 20:29

## 2019-08-14 RX ADMIN — QUETIAPINE FUMARATE 25 MG: 25 TABLET ORAL at 20:30

## 2019-08-14 ASSESSMENT — PAIN SCALES - GENERAL
PAINLEVEL_OUTOF10: 2
PAINLEVEL_OUTOF10: 0
PAINLEVEL_OUTOF10: 0

## 2019-08-14 ASSESSMENT — PAIN DESCRIPTION - PAIN TYPE: TYPE: OTHER (COMMENT)

## 2019-08-14 NOTE — PLAN OF CARE
Care manager and social working helping with discharge needs. Problem: Safety:  Goal: Ability to remain free from injury will improve  Description  Ability to remain free from injury will improve  8/14/2019 0115 by Calista Medel RN  Outcome: Ongoing  Note:   Pt free form injury so far this shift. Will continue to monitor. Problem: DISCHARGE BARRIERS  Goal: Patient's continuum of care needs are met  8/14/2019 0115 by Calista Medel RN  Outcome: Ongoing  Note:   Pt plans return to [facility] at discharge. Care manager and social working helping with discharge needs. Problem: Musculor/Skeletal Functional Status  Goal: Highest potential functional level  8/14/2019 0115 by Calista Medel RN  Outcome: Ongoing  Note:   Up with 1 assist, walker, and gait belt, and tolerates ambulation fairly well. Care plan reviewed with patient. Patient unable to verbalize understanding of the plan of care and contribute to goal setting.

## 2019-08-14 NOTE — PROGRESS NOTES
[facility]  INPATIENT PHYSICAL THERAPY  DAILY NOTE  STR ONC MED 5K - 5K-13/013-A    Time In: 726  Time Out: 5610  Timed Code Treatment Minutes: 25 Minutes  Minutes: 25          Date: 2019  Patient Name: Vanessa Garcia,  Gender:  female        MRN: [id number]  : 8/3/1930  (80 y.o.)     Referring Practitioner: Odessa Parra CNP  Diagnosis: NINO  Additional Pertinent Hx: admit with above diagnosis, multiple falls, bilateral first rib fractures and post left second and third ribs, NINO, advanced dementia     Prior Level of Function:  Type of Home: Facility  Home Equipment: Rolling walker             Restrictions/Precautions:  Restrictions/Precautions: General Precautions, Fall Risk    SUBJECTIVE: Pt resting in bed with sitter and daughter present. All agree to therapy. PAIN: denies     OBJECTIVE:  Bed Mobility:  Supine to Sit: Minimal Assistance, to initiate  Sit to Supine: Minimal Assistance, to initiate    Transfers:  Sit to Stand: Contact Guard Assistance  Stand to [place] [address] with verbal cues to ensure properly positioned. Ambulation:  Contact Guard Assistance  Distance: 60 ft, 70 ft, and 15 ft  Surface: Level Tile  Device:Rolling Walker  Gait Deviations: Forward Flexed Posture, Slow Pita, Decreased Step Length Bilaterally, Decreased Gait Speed and occasional assist to manage RW to avoid obstacles and to stay within walker especially with turns    Exercise:  Patient was guided in 1 set(s) 5-10 reps of exercise to both lower extremities. Ankle pumps, Heelslides, Long arc quads, Hip abduction/adduction and Seated hip flexion. Exercises were completed for increased independence with functional mobility. Functional Outcome Measures: Completed  AM-PAC Inpatient Mobility without Stair Climbing Raw Score : 15  AM-PAC Inpatient without Stair Climbing T-Scale Score : 43.03    ASSESSMENT:  Assessment: Patient progressing toward established goals.  and needs frequent direction and redirection due to dementia for tasks at hand. Activity Tolerance:  Patient tolerance of  treatment: good. Equipment Recommendations:Equipment Needed: No  Discharge Recommendations:   Continue to assess pending progress, ECF with PT, Patient would benefit from continued therapy after discharge    Plan: Times per week: 3-5X GM  Times per day: Daily  Specific instructions for Next Treatment: therex and mobility    Patient Education  Patient Education: Plan of Care, Precautions/Restrictions    Goals:  Patient goals : not stated  Short term goals  Time Frame for Short term goals: by discharge  Short term goal 1: bed mobility with SBA  Short term goal 2: transfer with SBA  Short term goal 3: amb >75'x1 with RW and SBA to walk safely to dining celestin at [facility]  Long term goals  Time Frame for Long term goals : no LTGs set secondary to short ELOS    Following session, patient left in safe position with all fall risk precautions in place. Marcie Nur.  Brian Vallejo, [place] [place] [address]

## 2019-08-14 NOTE — PROGRESS NOTES
Date    AST 25 08/12/2019    ALT 11 08/12/2019    BILITOT 0.2 08/12/2019    ALKPHOS 57 08/12/2019          Assessment/Plan:  1. Multiple falls: noted old facial, bilateral first ribs and posterior left second and third ribs fractures. cont.  symptomatic control of pain with scheduled tylenol   2. Acute kidney injury: improved creatinine has normalized,   3. Non anion gap metabolic acidosis: ? Dehydration, advance dementia, monitor. cont. Fluids at 75 ml/hr    4. Macrocytic anemia: chronic, no active bleeding, cont ASA   5. Advanced dementia with behavioural changes, cont 1:1 'sitter'  cont. Aricept and  Seroquel,   6. Hypertension: Lopressor 25 mg BID and Amlodipine   7.  CAD:  daily ASA continued, stable        Electronically signed by Houston Talley MD on 8/14/2019 at 2:32 PM    Rounding Hospitalist

## 2019-08-14 NOTE — PLAN OF CARE
Problem: Pain:  Goal: Pain level will decrease  Description  Pain level will decrease  8/14/2019 1350 by Laura Owens RN  Outcome: Ongoing   Pt complain of hip pain today. Pt unable to give number on pain, nor give a number for a pain goal. Did give tylenol for the hip pain. Problem: Pain:  Goal: Control of acute pain  Description  Control of acute pain  8/14/2019 0115 by Jason Hernandez RN  Outcome: Ongoing  Note:   See above  Problem: Pain:  Goal: Control of chronic pain  Description  Control of chronic pain  8/14/2019 0115 by Jason Hernandez RN  Outcome: Ongoing  Note:   See above     Problem: Falls - Risk of:  Goal: Will remain free from falls  Description  Will remain free from falls  8/14/2019 1350 by Laura Owens RN  Outcome: Ongoing   Fall assessment completed. Pt is a high fall risk. Tries to get out of bed alone. Has sitter in room because of this. Pt very confused and combative. Problem: Risk for Impaired Skin Integrity  Goal: Tissue integrity - skin and mucous membranes  Description  Structural intactness and normal physiological function of skin and  mucous membranes. 8/14/2019 1350 by Laura Owens RN  Outcome: Ongoing   Pt has some redness on bottom. Try to get pt to turn q 2hours if she will. Will continue to monitor. Problem: Discharge Planning:  Goal: Discharged to appropriate level of care  Description  Discharged to appropriate level of care  8/14/2019 1350 by Laura Owens RN  Outcome: Ongoing   Pt plans to be discharged to [facility] in [facility], where she previously resided. Problem: DISCHARGE BARRIERS  Goal: Patient's continuum of care needs are met  8/14/2019 1350 by Laura Owens RN  Outcome: Ongoing   Pt needs assistance with all ADL's. Problem: Musculor/Skeletal Functional Status  Goal: Highest potential functional level  8/14/2019 1350 by Laura Owens RN  Outcome: Ongoing   Pt up with 1 assist, gait belt, and walker. Tolerates fairly well.     Care plan reviewed

## 2019-08-14 NOTE — PROGRESS NOTES
[facility]  INPATIENT OCCUPATIONAL THERAPY  [facility] ONC MED 5K  EVALUATION    Time In: 6725  Time Out: 1114  Timed Code Treatment Minutes: 20 Minutes  Minutes: 26          Date: 2019  Patient Name: Alfonso Lim,   Gender: female      MRN: [id number]  : 8/3/1930  (80 y.o.)  Referring Practitioner: Jeannine Quan MD  Diagnosis: NINO  Additional Pertinent Hx: Patient is a 80 y.o. female from [address] [place] [address] with multiple falls reported over the past week. Patient has advanced dementia and is alert to self only. She has a DNR-CC status. The most recent fall took place today. Patient has an abrasion noted to left cheek from previous fall. She is not anticoagulated. She does not complain of any pain. Restrictions/Precautions:  Restrictions/Precautions: General Precautions, Fall Risk    Subjective  Chart Reviewed: Yes, Orders, Progress Notes, History and Physical  Patient assessed for rehabilitation services?: Yes    Subjective: RN okayed session. Pt was supine in bed. Pt required max VC and tactile cues to participate. Pt was disoriented and very confused. Pt was very combative and paranoid throughout entire session. Pt most combative when touched. Pt becomes increasingly agitated when people talk to her. She is very impulsive, tries to run and move quickly. Comments: Pt initially resisted gait belt, able to put on pt for increased safety. Required x2 assist.     Pain:   No signs of pain    Social/Functional History:  History obtained from Case Mgmt. Pt is poor historian and disoriented to place, time, and situation.    Type of Home: Facility  Home Equipment: Rolling walker            Cognition/Orientation:  Overall Orientation Status: Impaired  Orientation Level: Disoriented to place, Disoriented to time, Disoriented to situation  Overall Cognitive Status: Exceptions  Cognition Comment: decreased safety, decreased attention, decreased memory, decreased insight, decreased judgement, decreased initiation, decreased problem solving, does not follow commands - quickly agitated with commands    ADL;s:  Toileting: Stand by assistance(Pt very impulsive, did not like someone in room with her, became very agitated)       Functional Mobility:  Bed mobility  Supine to Sit: Stand by assistance  Sit to Supine: 2 Person assistance, Moderate assistance(Pt initially resistive, x1 A for UB and x1 for BLE)  Scooting: Stand by assistance, 2 Person assistance, Dependent/Total(Pt able to scoot, when she wants to. Dep when back in bed and adjusting to center)    Functional Mobility  Functional - Mobility Device: Rolling Walker  Activity: To/from bathroom, Other(in hallway)  Assist Level: Contact guard assistance  Functional Mobility Comments: Pt moved very quickly, decreased safety awareness, pt very unsteady- especially when trying to hit/pull at someone, Pt demoed multiple LOB throughout. Most LOB occurred when pt was increasingly agitated. Pt able to self correct 50% of time, other 50% OTS corrected. Pt tried to walk without RW and IV pole, agitated when corrected. Pt agitated with CGA, able to ignore it if standing behind her out of sight. Pt very impulsive.      Balance:  Balance  Sitting Balance: Stand by assistance  Standing Balance: Contact guard assistance(very unsteady)  Standing Balance  Time: x3 min  Activity: talking to daughter, nurse, tech, and OTS  Comment: Pt was very unsteady    Transfers:  Sit to stand: Contact guard assistance(from EOB)  Stand to sit: Contact guard assistance(to EOB)  Toilet Transfers  Toilet - Technique: Ambulating  Equipment Used: Grab bars  Toilet Transfer: Contact guard assistance    Upper Extremity Assessment:   LUE AROM : WFL  LUE General AROM: per observation  RUE AROM : WFL  RUE General AROM: per observation    LUE Strength  Gross LUE Strength: WFL  RUE Strength  Gross RUE Strength: WFL    RUE Tone: Normotonic  LUE Tone: Normotonic       Activity Tolerance:

## 2019-08-15 ENCOUNTER — APPOINTMENT (OUTPATIENT)
Dept: GENERAL RADIOLOGY | Age: 84
DRG: 683 | End: 2019-08-15
Payer: MEDICARE

## 2019-08-15 ENCOUNTER — APPOINTMENT (OUTPATIENT)
Dept: CT IMAGING | Age: 84
DRG: 683 | End: 2019-08-15
Payer: MEDICARE

## 2019-08-15 LAB
ANION GAP SERPL CALCULATED.3IONS-SCNC: 13 MEQ/L (ref 8–16)
BUN BLDV-MCNC: 18 MG/DL (ref 7–22)
CALCIUM SERPL-MCNC: 8.6 MG/DL (ref 8.5–10.5)
CHLORIDE BLD-SCNC: 104 MEQ/L (ref 98–111)
CO2: 22 MEQ/L (ref 23–33)
CREAT SERPL-MCNC: 0.7 MG/DL (ref 0.4–1.2)
EKG ATRIAL RATE: 88 BPM
EKG P AXIS: 86 DEGREES
EKG P-R INTERVAL: 220 MS
EKG Q-T INTERVAL: 428 MS
EKG QRS DURATION: 162 MS
EKG QTC CALCULATION (BAZETT): 517 MS
EKG R AXIS: 45 DEGREES
EKG T AXIS: 114 DEGREES
EKG VENTRICULAR RATE: 88 BPM
ERYTHROCYTE [DISTWIDTH] IN BLOOD BY AUTOMATED COUNT: 13.3 % (ref 11.5–14.5)
ERYTHROCYTE [DISTWIDTH] IN BLOOD BY AUTOMATED COUNT: 47.3 FL (ref 35–45)
GFR SERPL CREATININE-BSD FRML MDRD: 79 ML/MIN/1.73M2
GLUCOSE BLD-MCNC: 100 MG/DL (ref 70–108)
HCT VFR BLD CALC: 29.7 % (ref 37–47)
HEMOGLOBIN: 10 GM/DL (ref 12–16)
MCH RBC QN AUTO: 32.6 PG (ref 26–33)
MCHC RBC AUTO-ENTMCNC: 33.7 GM/DL (ref 32.2–35.5)
MCV RBC AUTO: 96.7 FL (ref 81–99)
PLATELET # BLD: 144 THOU/MM3 (ref 130–400)
PMV BLD AUTO: 10.2 FL (ref 9.4–12.4)
POTASSIUM SERPL-SCNC: 4.2 MEQ/L (ref 3.5–5.2)
RBC # BLD: 3.07 MILL/MM3 (ref 4.2–5.4)
SODIUM BLD-SCNC: 139 MEQ/L (ref 135–145)
WBC # BLD: 9.1 THOU/MM3 (ref 4.8–10.8)

## 2019-08-15 PROCEDURE — 6370000000 HC RX 637 (ALT 250 FOR IP): Performed by: INTERNAL MEDICINE

## 2019-08-15 PROCEDURE — 85027 COMPLETE CBC AUTOMATED: CPT

## 2019-08-15 PROCEDURE — 1200000000 HC SEMI PRIVATE

## 2019-08-15 PROCEDURE — 6370000000 HC RX 637 (ALT 250 FOR IP): Performed by: NURSE PRACTITIONER

## 2019-08-15 PROCEDURE — 93005 ELECTROCARDIOGRAM TRACING: CPT | Performed by: INTERNAL MEDICINE

## 2019-08-15 PROCEDURE — 80048 BASIC METABOLIC PNL TOTAL CA: CPT

## 2019-08-15 PROCEDURE — 36415 COLL VENOUS BLD VENIPUNCTURE: CPT

## 2019-08-15 PROCEDURE — 93010 ELECTROCARDIOGRAM REPORT: CPT | Performed by: INTERNAL MEDICINE

## 2019-08-15 PROCEDURE — 99233 SBSQ HOSP IP/OBS HIGH 50: CPT | Performed by: INTERNAL MEDICINE

## 2019-08-15 PROCEDURE — 70450 CT HEAD/BRAIN W/O DYE: CPT

## 2019-08-15 PROCEDURE — 2580000003 HC RX 258: Performed by: NURSE PRACTITIONER

## 2019-08-15 PROCEDURE — 71045 X-RAY EXAM CHEST 1 VIEW: CPT

## 2019-08-15 RX ADMIN — ASPIRIN 81 MG 81 MG: 81 TABLET ORAL at 10:10

## 2019-08-15 RX ADMIN — QUETIAPINE FUMARATE 25 MG: 25 TABLET ORAL at 21:42

## 2019-08-15 RX ADMIN — MAGNESIUM HYDROXIDE 30 ML: 400 SUSPENSION ORAL at 10:13

## 2019-08-15 RX ADMIN — METOPROLOL TARTRATE 25 MG: 50 TABLET, FILM COATED ORAL at 10:08

## 2019-08-15 RX ADMIN — DOCUSATE SODIUM 100 MG: 50 LIQUID ORAL at 10:13

## 2019-08-15 RX ADMIN — METOPROLOL TARTRATE 50 MG: 50 TABLET, FILM COATED ORAL at 21:41

## 2019-08-15 RX ADMIN — DONEPEZIL HYDROCHLORIDE 5 MG: 5 TABLET, FILM COATED ORAL at 21:42

## 2019-08-15 RX ADMIN — AMLODIPINE BESYLATE 5 MG: 5 TABLET ORAL at 10:08

## 2019-08-15 RX ADMIN — SODIUM CHLORIDE, PRESERVATIVE FREE 10 ML: 5 INJECTION INTRAVENOUS at 01:45

## 2019-08-15 RX ADMIN — SODIUM CHLORIDE, PRESERVATIVE FREE 10 ML: 5 INJECTION INTRAVENOUS at 10:16

## 2019-08-15 RX ADMIN — SODIUM CHLORIDE, PRESERVATIVE FREE 10 ML: 5 INJECTION INTRAVENOUS at 21:41

## 2019-08-15 RX ADMIN — QUETIAPINE FUMARATE 25 MG: 25 TABLET ORAL at 10:08

## 2019-08-15 RX ADMIN — ACETAMINOPHEN 500 MG: 500 TABLET ORAL at 21:41

## 2019-08-15 ASSESSMENT — PAIN SCALES - GENERAL
PAINLEVEL_OUTOF10: 0
PAINLEVEL_OUTOF10: 3
PAINLEVEL_OUTOF10: 0

## 2019-08-15 NOTE — CARE COORDINATION
8/15/19, 1:27 PM    DISCHARGE BARRIERS    Sitter not removed until this morning. Spoke with Hyacinth at [employer] and advised her of this-telesitter to be on this evening and if no issues, they will accept patient tomorrow.

## 2019-08-15 NOTE — CARE COORDINATION
[address] Transitions Interview     8/15/2019    Patient: Joselito Daley Patient : 8/3/1930   MRN: [id number]  Reason for Admission:   RARS: Readmission Risk Score: 16         Introduced self/role. Explained BPCI-A program and beneficiary letter. Patient verbalized understandment. Readmission Risk  Patient Active Problem List   Diagnosis    Asthma    S/P cardiac catheterization: 2017: diffuse disease in mid-LAD with multiple lesions in the 80% range. LCX non-dominant with moderate disease. RCA dominant with moderate diffuse disease.  S/P PTCA: 2017: Stent mid-LAD Xience 3.0 x 33 mm, post-dilated 3.32 mm. Radial 70 cc.  Confusion    Delirium    Alzheimer's dementia with behavioral disturbance    Essential hypertension    CAD (coronary artery disease)    Hyperlipidemia    Vitamin D deficiency    [facility]INO [facility]) ([facility] [address]    Multiple fractures of ribs, bilateral, initial encounter for closed fracture    Facial bone fracture (HCC)    At high risk for falls    Closed fracture of maxillary sinus (HCC)    Closed head injury    Hyperkalemia       Inpatient Assessment  Care Transitions Summary    Care Transitions Inpatient Review  Medication Review  Housing Review  Social Support  Durable Medical Equipment  Functional Review  Hearing and Vision  Care Transitions Interventions  No Identified Needs         Follow Up  No future appointments. Health Maintenance  There are no preventive care reminders to display for this patient.     Kaylee Chaudhari RN

## 2019-08-15 NOTE — PROGRESS NOTES
Hospitalist Progress Note  [facility] Onc Med 5K       Patient: Vanessa Garcia  Unit/Bed: 6V-24/154-J  YOB: 1930  MRN: [id number]  Acct: [de-identified]   AdmittingDiagnosis: NINO (acute kidney injury) ([facility] [address] [N17.9]  NINO (acute kidney injury) ([facility] [address] [N17.9]  Admit Date: 8/12/2019  Hospital Day: 2    Subjective:    Patient reportedly slept through the night no more agitation, one-on-one sitter has been replaced with telemetry sitter and she is being stable no fever chills vital signs are stable    Objective:   BP (!) 153/67   Pulse 67   Temp 97.8 °F (36.6 °C) (Oral)   Resp 16   Ht 5' 5\" (1.651 m)   Wt 111 lb (50.3 kg)   SpO2 94%   BMI 18.47 kg/m²       Intake/Output Summary (Last 24 hours) at 8/15/2019 1458  Last data filed at 8/15/2019 0500  Gross per 24 hour   Intake 589 ml   Output 610 ml   Net -21 ml     Physical Exam  General:    chronically ill appearing white female   HEENT:  normocephalic facial bruise noted on the L Cheek     No scleral icterus. Neck: supple.  No JVD. Lungs: clear to auscultation.  No retractions  Cardiac: RRR, no murmur   Abdomen: soft.  Nontender. Extremities:  No clubbing, cyanosis, or edema x 4. However multiple bruises noted    Vasculature: capillary refill < 3 seconds. Skin:  warm and dry. Psych:  Alert but not oriented   Lymph:  No supraclavicular adenopathy. Neurologic:  No focal deficit.  No seizures     Data:  CBC:   Lab Results   Component Value Date    WBC 9.1 08/15/2019    HGB 10.0 08/15/2019    HCT 29.7 08/15/2019    MCV 96.7 08/15/2019     08/15/2019     BMP:   Lab Results   Component Value Date     08/15/2019    K 4.2 08/15/2019    K 4.3 08/13/2019     08/15/2019    CO2 22 08/15/2019    BUN 18 08/15/2019    CREATININE 0.7 08/15/2019    CALCIUM 8.6 08/15/2019     ABGs:   Lab Results   Component Value Date    PH 7.43 08/14/2019    PCO2 34 08/14/2019    PO2 62 08/14/2019    HCO3 22 08/14/2019    O2SAT 92 08/14/2019     Troponin: No

## 2019-08-16 VITALS
WEIGHT: 111 LBS | TEMPERATURE: 97.8 F | BODY MASS INDEX: 18.49 KG/M2 | SYSTOLIC BLOOD PRESSURE: 136 MMHG | HEIGHT: 65 IN | DIASTOLIC BLOOD PRESSURE: 62 MMHG | OXYGEN SATURATION: 98 % | HEART RATE: 72 BPM | RESPIRATION RATE: 18 BRPM

## 2019-08-16 PROBLEM — N17.9 AKI (ACUTE KIDNEY INJURY) (HCC): Status: RESOLVED | Noted: 2019-08-12 | Resolved: 2019-08-16

## 2019-08-16 PROCEDURE — 6370000000 HC RX 637 (ALT 250 FOR IP): Performed by: INTERNAL MEDICINE

## 2019-08-16 PROCEDURE — 2580000003 HC RX 258: Performed by: NURSE PRACTITIONER

## 2019-08-16 PROCEDURE — 99239 HOSP IP/OBS DSCHRG MGMT >30: CPT | Performed by: INTERNAL MEDICINE

## 2019-08-16 PROCEDURE — 6370000000 HC RX 637 (ALT 250 FOR IP): Performed by: NURSE PRACTITIONER

## 2019-08-16 RX ORDER — AMLODIPINE BESYLATE 5 MG/1
5 TABLET ORAL DAILY
Qty: 30 TABLET | Refills: 3 | Status: SHIPPED | OUTPATIENT
Start: 2019-08-17

## 2019-08-16 RX ORDER — HYDROCHLOROTHIAZIDE 12.5 MG/1
12.5 CAPSULE, GELATIN COATED ORAL EVERY MORNING
Qty: 30 CAPSULE | Refills: 0 | Status: SHIPPED | OUTPATIENT
Start: 2019-08-16

## 2019-08-16 RX ADMIN — METOPROLOL TARTRATE 25 MG: 50 TABLET, FILM COATED ORAL at 10:35

## 2019-08-16 RX ADMIN — AMLODIPINE BESYLATE 5 MG: 5 TABLET ORAL at 10:35

## 2019-08-16 RX ADMIN — QUETIAPINE FUMARATE 25 MG: 25 TABLET ORAL at 10:35

## 2019-08-16 RX ADMIN — ASPIRIN 81 MG 81 MG: 81 TABLET ORAL at 10:34

## 2019-08-16 RX ADMIN — SODIUM CHLORIDE, PRESERVATIVE FREE 10 ML: 5 INJECTION INTRAVENOUS at 10:35

## 2019-08-16 RX ADMIN — DOCUSATE SODIUM 100 MG: 50 LIQUID ORAL at 10:35

## 2019-08-16 ASSESSMENT — PAIN SCALES - GENERAL
PAINLEVEL_OUTOF10: 0

## 2019-08-16 NOTE — PLAN OF CARE
Problem: Pain:  Goal: Pain level will decrease  Description  Pain level will decrease  8/16/2019 0428 by Negar Escudero RN  Outcome: Ongoing  Note:   Tylenol prn effective. 8/15/2019 2019 by Sancho Vinson RN  Outcome: Ongoing  Note:   Pt has denied any pain this shift. Pt has tylenol as needed for pain. Problem: Pain:  Goal: Control of acute pain  Description  Control of acute pain  8/16/2019 0428 by Negar Escudero RN  Outcome: Ongoing  Note:   Positioning and assistance from staff along with other nonpharmalogical methods in use. 8/15/2019 2019 by Sancho Vinson RN  Outcome: Ongoing  Note:   Patient has denied any pain this shift. Problem: Pain:  Goal: Control of chronic pain  Description  Control of chronic pain  8/16/2019 0428 by Negar Escudero RN  Outcome: Ongoing  Note:   Tylenol prn effective. 8/15/2019 2019 by Sancho Vinson RN  Outcome: Ongoing  Note:   Pt denies any chronic pain. Problem: Falls - Risk of:  Goal: Will remain free from falls  Description  Will remain free from falls  8/16/2019 0428 by Negar Escudero RN  Outcome: Ongoing  Note:   Fall preventions in place. Telesitter in use. 8/15/2019 2019 by Sancho Vinson RN  Outcome: Ongoing  Note:   Patient up with 2 assist with walker. Patient's gait unsteady. Pt not compliant with using call light appropriately do to patient having dementia. Problem: Falls - Risk of:  Goal: Absence of physical injury  Description  Absence of physical injury  8/16/2019 0428 by Negar Escudero RN  Outcome: Ongoing  Note:   Interventions including telesitter effective this shift. 8/15/2019 2019 by Sancho Vinson RN  Outcome: Ongoing  Note:   Pt free of physical injury. Problem: Risk for Impaired Skin Integrity  Goal: Tissue integrity - skin and mucous membranes  Description  Structural intactness and normal physiological function of skin and  mucous membranes.   8/16/2019 0428 by Negar Escudero RN  Outcome: Ongoing  Note:   Repositioned and pillow of:  Intervention: Assess fall prevention measures  Note:   Fall preventions in place. Socks, bed alarm. Telesitter. Frequent checks. Problem: Falls - Risk of: Intervention: Assist ambulation  Note:   Assist of 2 for transfer or ambulation with walker. Problem: Falls - Risk of: Intervention: Manage a safe environment  Note:   Interventions in place for safe environment. Small light. Clear pathways. Problem: Falls - Risk of: Intervention: Implement fall precaution identifier  Note:   Noted. Problem: Falls - Risk of: Intervention: Flexibility training  Note:   Interdisciplinary approach in use. Problem: Falls - Risk of: Intervention: Instruct strength and mobility  Note:   Pt unable to follow directions fully. Problem: DISCHARGE BARRIERS  Intervention: Perform discharge planning  Note: In progress. Problem: Musculor/Skeletal Functional Status  Intervention: PT Evaluation/treatment  Note:   Interdisciplinary approach in use. Problem: Musculor/Skeletal Functional Status  Intervention: OT Evaluation/treatment  Note:   Interdisciplinary approach in use.

## 2019-08-20 ENCOUNTER — OUTSIDE SERVICES (OUTPATIENT)
Dept: FAMILY MEDICINE CLINIC | Age: 84
End: 2019-08-20
Payer: MEDICARE

## 2019-08-20 DIAGNOSIS — G30.1 LATE ONSET ALZHEIMER'S DISEASE WITH BEHAVIORAL DISTURBANCE (HCC): Primary | ICD-10-CM

## 2019-08-20 DIAGNOSIS — E55.9 VITAMIN D DEFICIENCY: ICD-10-CM

## 2019-08-20 DIAGNOSIS — Z98.890 S/P CARDIAC CATHETERIZATION: ICD-10-CM

## 2019-08-20 DIAGNOSIS — S02.92XA CLOSED FRACTURE OF FACIAL BONE, UNSPECIFIED FACIAL BONE, INITIAL ENCOUNTER (HCC): ICD-10-CM

## 2019-08-20 DIAGNOSIS — S09.90XA CLOSED HEAD INJURY, INITIAL ENCOUNTER: ICD-10-CM

## 2019-08-20 DIAGNOSIS — R41.0 CONFUSION: ICD-10-CM

## 2019-08-20 DIAGNOSIS — J45.909 ASTHMA, UNSPECIFIED ASTHMA SEVERITY, UNSPECIFIED WHETHER COMPLICATED, UNSPECIFIED WHETHER PERSISTENT: ICD-10-CM

## 2019-08-20 DIAGNOSIS — S02.401A CLOSED FRACTURE OF MAXILLARY SINUS, INITIAL ENCOUNTER (HCC): ICD-10-CM

## 2019-08-20 DIAGNOSIS — Z91.81 AT HIGH RISK FOR FALLS: ICD-10-CM

## 2019-08-20 DIAGNOSIS — I25.709 CORONARY ARTERY DISEASE INVOLVING CORONARY BYPASS GRAFT OF NATIVE HEART WITH ANGINA PECTORIS (HCC): ICD-10-CM

## 2019-08-20 DIAGNOSIS — F02.818 LATE ONSET ALZHEIMER'S DISEASE WITH BEHAVIORAL DISTURBANCE (HCC): Primary | ICD-10-CM

## 2019-08-20 DIAGNOSIS — E78.5 HYPERLIPIDEMIA, UNSPECIFIED HYPERLIPIDEMIA TYPE: ICD-10-CM

## 2019-08-20 DIAGNOSIS — S22.43XA MULTIPLE FRACTURES OF RIBS, BILATERAL, INITIAL ENCOUNTER FOR CLOSED FRACTURE: ICD-10-CM

## 2019-08-20 DIAGNOSIS — R41.0 DELIRIUM: ICD-10-CM

## 2019-08-20 DIAGNOSIS — I10 ESSENTIAL HYPERTENSION: ICD-10-CM

## 2019-08-20 DIAGNOSIS — Z98.61 S/P PTCA (PERCUTANEOUS TRANSLUMINAL CORONARY ANGIOPLASTY): ICD-10-CM

## 2019-08-20 PROCEDURE — 99309 SBSQ NF CARE MODERATE MDM 30: CPT | Performed by: PHYSICAL MEDICINE & REHABILITATION

## 2019-08-21 VITALS
WEIGHT: 115 LBS | SYSTOLIC BLOOD PRESSURE: 118 MMHG | DIASTOLIC BLOOD PRESSURE: 68 MMHG | HEART RATE: 72 BPM | BODY MASS INDEX: 19.14 KG/M2 | RESPIRATION RATE: 16 BRPM | OXYGEN SATURATION: 94 % | TEMPERATURE: 97.8 F

## 2019-08-21 ASSESSMENT — ENCOUNTER SYMPTOMS
VOMITING: 0
NAUSEA: 0
RHINORRHEA: 0
WHEEZING: 0
EYE REDNESS: 0
DIARRHEA: 0
SORE THROAT: 0
COUGH: 0
SHORTNESS OF BREATH: 0
COLOR CHANGE: 1
STRIDOR: 0
CONSTIPATION: 0
PHOTOPHOBIA: 0

## 2019-08-21 NOTE — PROGRESS NOTES
total doses. If no relief after 1 dose, call 911. 25 tablet 1    Budesonide-Formoterol Fumarate (SYMBICORT IN) Inhale into the lungs as needed      metoprolol tartrate (LOPRESSOR) 25 MG tablet Take 1 tablet by mouth 2 times daily 180 tablet 3     No current facility-administered medications for this visit. Allergies   Allergen Reactions    Brilinta [Ticagrelor] Shortness Of Breath    Demerol Hcl [Meperidine] Nausea And Vomiting       Health Maintenance   Topic Date Due    Shingles Vaccine (1 of 2) 08/03/1980    Annual Wellness Visit (AWV)  08/03/1993    DTaP/Tdap/Td vaccine (1 - Tdap) 06/30/2015    Flu vaccine (1) 09/01/2019    Potassium monitoring  08/15/2020    Creatinine monitoring  08/15/2020    Pneumococcal 65+ years Vaccine  Completed       Subjective:      Review of Systems   Constitutional: Negative for appetite change, chills, fatigue and fever. HENT: Negative for congestion, rhinorrhea and sore throat. Facial bruising   Eyes: Negative for photophobia, redness and visual disturbance. Respiratory: Negative for cough, shortness of breath, wheezing and stridor. Cardiovascular: Negative for chest pain and leg swelling. Gastrointestinal: Negative for constipation, diarrhea, nausea and vomiting. Endocrine: Negative for polydipsia, polyphagia and polyuria. Genitourinary: Negative for dysuria, frequency and hematuria. Musculoskeletal: Positive for gait problem. Negative for arthralgias and myalgias. Multiple fractures but denies pain   Skin: Positive for color change. Negative for rash and wound. Allergic/Immunologic: Negative for environmental allergies and immunocompromised state. Neurological: Positive for weakness. Negative for dizziness, light-headedness and headaches. Hematological: Does not bruise/bleed easily. Psychiatric/Behavioral: Positive for confusion and decreased concentration. Negative for dysphoric mood, self-injury and sleep disturbance.  The

## 2021-03-03 NOTE — CARE COORDINATION
Discharge orders on chart. Met with Jeannette Torres, her son and daughter present at bedside. All are in agreement for discharge back to [facility] [employer] updated. Jeannette Torres will leave when her transportation back to [facility] arrives.  Electronically signed by Parrish Simpson RN on 8/16/19 at 12:14 PM 0

## (undated) DEVICE — GLOVE ORANGE PI 7   MSG9070

## (undated) DEVICE — STERILE COTTON BALLS LARGE 5/P: Brand: MEDLINE

## (undated) DEVICE — GOWN,SIRUS,NON REINFRCD,LARGE,SET IN SL: Brand: MEDLINE

## (undated) DEVICE — GLOVE SURG SZ 8 L11.77IN FNGR THK9.8MIL STRW LTX POLYMER

## (undated) DEVICE — YANKAUER,BULB TIP,W/O VENT,RIGID,STERILE: Brand: MEDLINE

## (undated) DEVICE — TUBING, SUCTION, 1/4" X 12', STRAIGHT: Brand: MEDLINE

## (undated) DEVICE — PACK PROCEDURE SURG PLAS SC MIN SRHP LF

## (undated) DEVICE — SOLUTION IV 1000ML 0.9% SOD CHL PH 5 INJ USP VIAFLX PLAS